# Patient Record
Sex: MALE | Race: WHITE | Employment: OTHER | URBAN - METROPOLITAN AREA
[De-identification: names, ages, dates, MRNs, and addresses within clinical notes are randomized per-mention and may not be internally consistent; named-entity substitution may affect disease eponyms.]

---
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
## 2017-01-24 ENCOUNTER — TELEPHONE (OUTPATIENT)
Dept: CARDIOLOGY CLINIC | Age: 72
End: 2017-01-24

## 2017-01-24 ENCOUNTER — OFFICE VISIT (OUTPATIENT)
Dept: CARDIOLOGY CLINIC | Age: 72
End: 2017-01-24

## 2017-01-24 ENCOUNTER — ANTI-COAG VISIT (OUTPATIENT)
Dept: CARDIOLOGY CLINIC | Age: 72
End: 2017-01-24

## 2017-01-24 VITALS
BODY MASS INDEX: 35.14 KG/M2 | DIASTOLIC BLOOD PRESSURE: 80 MMHG | SYSTOLIC BLOOD PRESSURE: 128 MMHG | HEIGHT: 71 IN | HEART RATE: 68 BPM | WEIGHT: 251 LBS

## 2017-01-24 DIAGNOSIS — G47.33 OBSTRUCTIVE SLEEP APNEA SYNDROME: ICD-10-CM

## 2017-01-24 DIAGNOSIS — I48.91 ATRIAL FIBRILLATION, UNSPECIFIED TYPE (HCC): ICD-10-CM

## 2017-01-24 DIAGNOSIS — R00.0 TACHYCARDIA: ICD-10-CM

## 2017-01-24 DIAGNOSIS — E78.5 HYPERLIPIDEMIA, UNSPECIFIED HYPERLIPIDEMIA TYPE: Primary | ICD-10-CM

## 2017-01-24 DIAGNOSIS — Z72.0 TOBACCO ABUSE: ICD-10-CM

## 2017-01-24 PROCEDURE — G8427 DOCREV CUR MEDS BY ELIG CLIN: HCPCS | Performed by: INTERNAL MEDICINE

## 2017-01-24 PROCEDURE — 3017F COLORECTAL CA SCREEN DOC REV: CPT | Performed by: INTERNAL MEDICINE

## 2017-01-24 PROCEDURE — 4040F PNEUMOC VAC/ADMIN/RCVD: CPT | Performed by: INTERNAL MEDICINE

## 2017-01-24 PROCEDURE — 1123F ACP DISCUSS/DSCN MKR DOCD: CPT | Performed by: INTERNAL MEDICINE

## 2017-01-24 PROCEDURE — 4004F PT TOBACCO SCREEN RCVD TLK: CPT | Performed by: INTERNAL MEDICINE

## 2017-01-24 PROCEDURE — G8419 CALC BMI OUT NRM PARAM NOF/U: HCPCS | Performed by: INTERNAL MEDICINE

## 2017-01-24 PROCEDURE — G8484 FLU IMMUNIZE NO ADMIN: HCPCS | Performed by: INTERNAL MEDICINE

## 2017-01-24 PROCEDURE — 99214 OFFICE O/P EST MOD 30 MIN: CPT | Performed by: INTERNAL MEDICINE

## 2017-01-24 RX ORDER — DIGOXIN 125 MCG
125 TABLET ORAL DAILY
Qty: 90 TABLET | Refills: 3 | Status: SHIPPED | OUTPATIENT
Start: 2017-01-24 | End: 2018-02-09 | Stop reason: SDUPTHER

## 2017-01-24 RX ORDER — LOVASTATIN 10 MG/1
10 TABLET ORAL NIGHTLY
Qty: 90 TABLET | Refills: 3 | Status: SHIPPED | OUTPATIENT
Start: 2017-01-24 | End: 2018-02-09 | Stop reason: SDUPTHER

## 2017-01-24 RX ORDER — ATENOLOL 25 MG/1
25 TABLET ORAL DAILY
Qty: 90 TABLET | Refills: 3 | Status: SHIPPED | OUTPATIENT
Start: 2017-01-24 | End: 2018-02-15 | Stop reason: SDUPTHER

## 2017-01-26 ENCOUNTER — TELEPHONE (OUTPATIENT)
Dept: CARDIOLOGY CLINIC | Age: 72
End: 2017-01-26

## 2017-02-10 ENCOUNTER — TELEPHONE (OUTPATIENT)
Dept: CARDIOLOGY CLINIC | Age: 72
End: 2017-02-10

## 2017-02-15 LAB — INR BLD: 1.74

## 2017-02-16 ENCOUNTER — ANTI-COAG VISIT (OUTPATIENT)
Dept: CARDIOLOGY CLINIC | Age: 72
End: 2017-02-16

## 2017-03-06 LAB — INR BLD: 3.3

## 2017-03-07 ENCOUNTER — ANTI-COAG VISIT (OUTPATIENT)
Dept: CARDIOLOGY CLINIC | Age: 72
End: 2017-03-07

## 2017-03-24 LAB — INR BLD: 2.11

## 2017-03-27 ENCOUNTER — ANTI-COAG VISIT (OUTPATIENT)
Dept: CARDIOLOGY CLINIC | Age: 72
End: 2017-03-27

## 2017-04-24 LAB — INR BLD: 3.13

## 2017-04-25 ENCOUNTER — ANTI-COAG VISIT (OUTPATIENT)
Dept: CARDIOLOGY CLINIC | Age: 72
End: 2017-04-25

## 2017-05-09 RX ORDER — WARFARIN SODIUM 5 MG/1
TABLET ORAL
Qty: 90 TABLET | Refills: 3 | Status: SHIPPED | OUTPATIENT
Start: 2017-05-09 | End: 2018-02-09 | Stop reason: SDUPTHER

## 2017-05-26 LAB — INR BLD: 2.32

## 2017-06-02 ENCOUNTER — TELEPHONE (OUTPATIENT)
Dept: CARDIOLOGY CLINIC | Age: 72
End: 2017-06-02

## 2017-06-02 ENCOUNTER — ANTI-COAG VISIT (OUTPATIENT)
Dept: CARDIOLOGY CLINIC | Age: 72
End: 2017-06-02

## 2017-06-15 ENCOUNTER — TELEPHONE (OUTPATIENT)
Dept: CARDIOLOGY CLINIC | Age: 72
End: 2017-06-15

## 2017-06-27 LAB — INR BLD: 1.5

## 2017-07-05 ENCOUNTER — TELEPHONE (OUTPATIENT)
Dept: CARDIOLOGY CLINIC | Age: 72
End: 2017-07-05

## 2017-07-06 ENCOUNTER — ANTI-COAG VISIT (OUTPATIENT)
Dept: CARDIOLOGY CLINIC | Age: 72
End: 2017-07-06

## 2017-08-02 LAB — INR BLD: 2.25

## 2017-08-03 ENCOUNTER — ANTI-COAG VISIT (OUTPATIENT)
Dept: CARDIOLOGY CLINIC | Age: 72
End: 2017-08-03

## 2017-09-06 LAB — INR BLD: 1.69

## 2017-09-07 ENCOUNTER — ANTI-COAG VISIT (OUTPATIENT)
Dept: CARDIOLOGY CLINIC | Age: 72
End: 2017-09-07

## 2017-09-18 LAB — INR BLD: 2.65

## 2017-09-19 ENCOUNTER — ANTI-COAG VISIT (OUTPATIENT)
Dept: CARDIOLOGY CLINIC | Age: 72
End: 2017-09-19

## 2017-10-16 ENCOUNTER — TELEPHONE (OUTPATIENT)
Dept: CARDIOLOGY CLINIC | Age: 72
End: 2017-10-16

## 2017-10-16 DIAGNOSIS — I48.91 ATRIAL FIBRILLATION, UNSPECIFIED TYPE (HCC): Primary | ICD-10-CM

## 2017-10-16 LAB — INR BLD: 1.86

## 2017-10-16 NOTE — TELEPHONE ENCOUNTER
Lab calling stating pts standing protime order has , new order placed and faxed to lab at [phone]

## 2017-10-17 ENCOUNTER — ANTI-COAG VISIT (OUTPATIENT)
Dept: CARDIOLOGY CLINIC | Age: 72
End: 2017-10-17

## 2017-11-20 LAB — INR BLD: 2.31

## 2017-11-21 ENCOUNTER — ANTI-COAG VISIT (OUTPATIENT)
Dept: CARDIOLOGY CLINIC | Age: 72
End: 2017-11-21

## 2017-11-21 DIAGNOSIS — I48.91 ATRIAL FIBRILLATION, UNSPECIFIED TYPE (HCC): ICD-10-CM

## 2017-12-14 ENCOUNTER — ANTI-COAG VISIT (OUTPATIENT)
Dept: CARDIOLOGY CLINIC | Age: 72
End: 2017-12-14

## 2017-12-26 LAB — INR BLD: 1.92

## 2017-12-27 ENCOUNTER — ANTI-COAG VISIT (OUTPATIENT)
Dept: CARDIOLOGY CLINIC | Age: 72
End: 2017-12-27

## 2018-01-17 LAB — INR BLD: 2.02

## 2018-01-18 ENCOUNTER — ANTI-COAG VISIT (OUTPATIENT)
Dept: CARDIOLOGY CLINIC | Age: 73
End: 2018-01-18

## 2018-01-29 LAB — INR BLD: 2.45

## 2018-01-30 ENCOUNTER — ANTI-COAG VISIT (OUTPATIENT)
Dept: CARDIOLOGY CLINIC | Age: 73
End: 2018-01-30

## 2018-02-12 RX ORDER — LOVASTATIN 10 MG/1
TABLET ORAL
Qty: 90 TABLET | Refills: 3 | Status: SHIPPED | OUTPATIENT
Start: 2018-02-12 | End: 2021-01-21

## 2018-02-12 RX ORDER — DIGOXIN 125 MCG
TABLET ORAL
Qty: 90 TABLET | Refills: 3 | Status: SHIPPED | OUTPATIENT
Start: 2018-02-12 | End: 2019-03-11 | Stop reason: SDUPTHER

## 2018-02-12 RX ORDER — WARFARIN SODIUM 5 MG/1
TABLET ORAL
Qty: 90 TABLET | Refills: 3 | Status: SHIPPED | OUTPATIENT
Start: 2018-02-12 | End: 2019-02-18 | Stop reason: SDUPTHER

## 2018-02-15 ENCOUNTER — OFFICE VISIT (OUTPATIENT)
Dept: CARDIOLOGY CLINIC | Age: 73
End: 2018-02-15

## 2018-02-15 VITALS
HEART RATE: 64 BPM | HEIGHT: 71 IN | DIASTOLIC BLOOD PRESSURE: 70 MMHG | WEIGHT: 246 LBS | BODY MASS INDEX: 34.44 KG/M2 | SYSTOLIC BLOOD PRESSURE: 110 MMHG

## 2018-02-15 DIAGNOSIS — G47.33 OBSTRUCTIVE SLEEP APNEA SYNDROME: ICD-10-CM

## 2018-02-15 DIAGNOSIS — E78.5 HYPERLIPIDEMIA, UNSPECIFIED HYPERLIPIDEMIA TYPE: ICD-10-CM

## 2018-02-15 DIAGNOSIS — Z72.0 TOBACCO ABUSE: ICD-10-CM

## 2018-02-15 DIAGNOSIS — I48.91 ATRIAL FIBRILLATION, UNSPECIFIED TYPE (HCC): Primary | ICD-10-CM

## 2018-02-15 LAB
ALBUMIN SERPL-MCNC: 3.8 G/DL (ref 3.5–5)
ALBUMIN SERPL-MCNC: 3.8 G/DL (ref 3.5–5)
ALP BLD-CCNC: 84 IU/L (ref 40–129)
ALT SERPL-CCNC: 22 IU/L (ref 10–35)
AST SERPL-CCNC: 20 IU/L (ref 10–50)
BILIRUB SERPL-MCNC: 0.4 MG/DL (ref 0–1)
BILIRUBIN DIRECT: <0.2 MG/DL (ref 0–0.2)
BUN BLDV-MCNC: 15 MG/DL (ref 8–26)
CALCIUM SERPL-MCNC: 9 MG/DL (ref 8.8–10.1)
CHLORIDE BLD-SCNC: 104 MEQ/L (ref 101–111)
CHOLESTEROL, TOTAL: 150 MG/DL
CHOLESTEROL/HDL RATIO: 4.4 (ref 1.9–4.2)
CO2: 25 MEQ/L (ref 22–29)
CREAT SERPL-MCNC: 1.16 MG/DL (ref 0.64–1.27)
GFR AFRICAN AMERICAN: >60 ML/MIN/1.73 SQ METER
GFR NON-AFRICAN AMERICAN: >60 ML/MIN/1.73 SQ METER
GLUCOSE BLD-MCNC: 131 MG/DL (ref 70–99)
HDLC SERPL-MCNC: 34 MG/DL
LDL CHOLESTEROL CALCULATED: 91 MG/DL
LDL/HDL RATIO: 2.7 (ref 1–4)
PHOSPHORUS: 3.4 MG/DL (ref 2.7–4.5)
POTASSIUM SERPL-SCNC: 4.6 MEQ/L (ref 3.6–5.1)
SODIUM BLD-SCNC: 138 MEQ/L (ref 135–145)
TOTAL PROTEIN: 7.3 G/DL (ref 6.6–8.7)
TRIGL SERPL-MCNC: 126 MG/DL

## 2018-02-15 PROCEDURE — G8484 FLU IMMUNIZE NO ADMIN: HCPCS | Performed by: INTERNAL MEDICINE

## 2018-02-15 PROCEDURE — 4004F PT TOBACCO SCREEN RCVD TLK: CPT | Performed by: INTERNAL MEDICINE

## 2018-02-15 PROCEDURE — 3017F COLORECTAL CA SCREEN DOC REV: CPT | Performed by: INTERNAL MEDICINE

## 2018-02-15 PROCEDURE — 1123F ACP DISCUSS/DSCN MKR DOCD: CPT | Performed by: INTERNAL MEDICINE

## 2018-02-15 PROCEDURE — G8417 CALC BMI ABV UP PARAM F/U: HCPCS | Performed by: INTERNAL MEDICINE

## 2018-02-15 PROCEDURE — 99213 OFFICE O/P EST LOW 20 MIN: CPT | Performed by: INTERNAL MEDICINE

## 2018-02-15 PROCEDURE — G8427 DOCREV CUR MEDS BY ELIG CLIN: HCPCS | Performed by: INTERNAL MEDICINE

## 2018-02-15 PROCEDURE — 4040F PNEUMOC VAC/ADMIN/RCVD: CPT | Performed by: INTERNAL MEDICINE

## 2018-02-15 RX ORDER — ATENOLOL 25 MG/1
25 TABLET ORAL DAILY
Qty: 90 TABLET | Refills: 3 | Status: SHIPPED | OUTPATIENT
Start: 2018-02-15 | End: 2019-02-18 | Stop reason: SDUPTHER

## 2018-02-22 LAB — INR BLD: 2.26

## 2018-02-23 ENCOUNTER — ANTI-COAG VISIT (OUTPATIENT)
Dept: CARDIOLOGY CLINIC | Age: 73
End: 2018-02-23

## 2018-03-19 LAB — INR BLD: 2.63

## 2018-03-20 ENCOUNTER — ANTI-COAG VISIT (OUTPATIENT)
Dept: CARDIOLOGY CLINIC | Age: 73
End: 2018-03-20

## 2018-04-20 ENCOUNTER — TELEPHONE (OUTPATIENT)
Dept: CARDIOLOGY CLINIC | Age: 73
End: 2018-04-20

## 2018-04-20 DIAGNOSIS — I48.91 ATRIAL FIBRILLATION, UNSPECIFIED TYPE (HCC): Primary | ICD-10-CM

## 2018-04-20 LAB — INR BLD: 2.61

## 2018-04-23 ENCOUNTER — ANTI-COAG VISIT (OUTPATIENT)
Dept: CARDIOLOGY CLINIC | Age: 73
End: 2018-04-23

## 2018-05-02 ENCOUNTER — TELEPHONE (OUTPATIENT)
Dept: CARDIOLOGY CLINIC | Age: 73
End: 2018-05-02

## 2018-05-03 ENCOUNTER — TELEPHONE (OUTPATIENT)
Dept: CARDIOLOGY CLINIC | Age: 73
End: 2018-05-03

## 2018-05-17 LAB — INR BLD: 1.4

## 2018-05-18 ENCOUNTER — ANTI-COAG VISIT (OUTPATIENT)
Dept: CARDIOLOGY CLINIC | Age: 73
End: 2018-05-18

## 2018-06-07 ENCOUNTER — TELEPHONE (OUTPATIENT)
Dept: CARDIOLOGY CLINIC | Age: 73
End: 2018-06-07

## 2018-06-07 DIAGNOSIS — I48.91 ATRIAL FIBRILLATION, UNSPECIFIED TYPE (HCC): Primary | ICD-10-CM

## 2018-06-07 LAB — INR BLD: 3

## 2018-06-11 ENCOUNTER — ANTI-COAG VISIT (OUTPATIENT)
Dept: CARDIOLOGY CLINIC | Age: 73
End: 2018-06-11

## 2018-06-11 ENCOUNTER — TELEPHONE (OUTPATIENT)
Dept: CARDIOLOGY CLINIC | Age: 73
End: 2018-06-11

## 2018-06-11 DIAGNOSIS — I48.91 ATRIAL FIBRILLATION, UNSPECIFIED TYPE (HCC): ICD-10-CM

## 2018-07-09 LAB — INR BLD: 2.82

## 2018-07-10 ENCOUNTER — ANTI-COAG VISIT (OUTPATIENT)
Dept: CARDIOLOGY CLINIC | Age: 73
End: 2018-07-10

## 2018-08-06 LAB — INR BLD: 2.48

## 2018-08-07 ENCOUNTER — ANTI-COAG VISIT (OUTPATIENT)
Dept: CARDIOLOGY CLINIC | Age: 73
End: 2018-08-07

## 2018-09-05 LAB — INR BLD: 2.45

## 2018-09-06 ENCOUNTER — ANTI-COAG VISIT (OUTPATIENT)
Dept: CARDIOLOGY CLINIC | Age: 73
End: 2018-09-06

## 2018-10-08 LAB — INR BLD: 3.88

## 2018-10-09 ENCOUNTER — ANTI-COAG VISIT (OUTPATIENT)
Dept: CARDIOLOGY CLINIC | Age: 73
End: 2018-10-09

## 2018-10-15 ENCOUNTER — TELEPHONE (OUTPATIENT)
Dept: CARDIOLOGY CLINIC | Age: 73
End: 2018-10-15

## 2018-10-15 DIAGNOSIS — I48.91 ATRIAL FIBRILLATION, UNSPECIFIED TYPE (HCC): Primary | ICD-10-CM

## 2018-10-16 ENCOUNTER — TELEPHONE (OUTPATIENT)
Dept: CARDIOLOGY CLINIC | Age: 73
End: 2018-10-16

## 2018-10-16 DIAGNOSIS — I48.91 ATRIAL FIBRILLATION, UNSPECIFIED TYPE (HCC): Primary | ICD-10-CM

## 2018-10-16 LAB — INR BLD: 2.41

## 2018-10-17 ENCOUNTER — ANTI-COAG VISIT (OUTPATIENT)
Dept: CARDIOLOGY CLINIC | Age: 73
End: 2018-10-17

## 2018-11-02 ENCOUNTER — TELEPHONE (OUTPATIENT)
Dept: CARDIOLOGY CLINIC | Age: 73
End: 2018-11-02

## 2018-11-02 DIAGNOSIS — Z79.899 LONG-TERM USE OF HIGH-RISK MEDICATION: ICD-10-CM

## 2018-11-02 DIAGNOSIS — I48.91 ATRIAL FIBRILLATION, UNSPECIFIED TYPE (HCC): Primary | ICD-10-CM

## 2018-11-06 LAB — INR BLD: 3.46

## 2018-11-07 ENCOUNTER — ANTI-COAG VISIT (OUTPATIENT)
Dept: CARDIOLOGY CLINIC | Age: 73
End: 2018-11-07

## 2018-11-12 LAB — INR BLD: 2.1

## 2018-11-13 ENCOUNTER — ANTI-COAG VISIT (OUTPATIENT)
Dept: CARDIOLOGY CLINIC | Age: 73
End: 2018-11-13

## 2018-12-12 LAB — INR BLD: 3.58

## 2018-12-13 ENCOUNTER — ANTI-COAG VISIT (OUTPATIENT)
Dept: CARDIOLOGY CLINIC | Age: 73
End: 2018-12-13

## 2018-12-21 LAB — INR BLD: 1.82

## 2018-12-24 ENCOUNTER — ANTI-COAG VISIT (OUTPATIENT)
Dept: CARDIOLOGY CLINIC | Age: 73
End: 2018-12-24

## 2018-12-24 DIAGNOSIS — I48.91 ATRIAL FIBRILLATION, UNSPECIFIED TYPE (HCC): ICD-10-CM

## 2018-12-28 LAB — INR BLD: 2.06

## 2019-01-02 ENCOUNTER — ANTI-COAG VISIT (OUTPATIENT)
Dept: CARDIOLOGY CLINIC | Age: 74
End: 2019-01-02

## 2019-01-02 DIAGNOSIS — I48.91 ATRIAL FIBRILLATION, UNSPECIFIED TYPE (HCC): ICD-10-CM

## 2019-01-11 LAB
INR BLD: 1.82
INR BLD: 1.82

## 2019-01-14 ENCOUNTER — ANTI-COAG VISIT (OUTPATIENT)
Dept: CARDIOLOGY CLINIC | Age: 74
End: 2019-01-14

## 2019-02-01 ENCOUNTER — TELEPHONE (OUTPATIENT)
Dept: CARDIOLOGY CLINIC | Age: 74
End: 2019-02-01

## 2019-02-01 ENCOUNTER — ANTI-COAG VISIT (OUTPATIENT)
Dept: CARDIOLOGY CLINIC | Age: 74
End: 2019-02-01

## 2019-02-01 DIAGNOSIS — I48.91 ATRIAL FIBRILLATION, UNSPECIFIED TYPE (HCC): ICD-10-CM

## 2019-02-01 LAB — INR BLD: 2.2

## 2019-02-19 RX ORDER — ATENOLOL 25 MG/1
TABLET ORAL
Qty: 90 TABLET | Refills: 0 | Status: SHIPPED | OUTPATIENT
Start: 2019-02-19 | End: 2019-04-22 | Stop reason: SDUPTHER

## 2019-02-19 RX ORDER — WARFARIN SODIUM 5 MG/1
TABLET ORAL
Qty: 90 TABLET | Refills: 0 | Status: SHIPPED | OUTPATIENT
Start: 2019-02-19 | End: 2019-04-22 | Stop reason: SDUPTHER

## 2019-02-21 ENCOUNTER — OFFICE VISIT (OUTPATIENT)
Dept: CARDIOLOGY CLINIC | Age: 74
End: 2019-02-21
Payer: MEDICARE

## 2019-02-21 VITALS
WEIGHT: 252 LBS | BODY MASS INDEX: 35.28 KG/M2 | HEIGHT: 71 IN | DIASTOLIC BLOOD PRESSURE: 74 MMHG | HEART RATE: 64 BPM | SYSTOLIC BLOOD PRESSURE: 120 MMHG

## 2019-02-21 DIAGNOSIS — I25.83 CORONARY ARTERY DISEASE DUE TO LIPID RICH PLAQUE: ICD-10-CM

## 2019-02-21 DIAGNOSIS — E78.5 HYPERLIPIDEMIA, UNSPECIFIED HYPERLIPIDEMIA TYPE: ICD-10-CM

## 2019-02-21 DIAGNOSIS — I48.91 ATRIAL FIBRILLATION, UNSPECIFIED TYPE (HCC): Primary | ICD-10-CM

## 2019-02-21 DIAGNOSIS — I25.10 CORONARY ARTERY DISEASE DUE TO LIPID RICH PLAQUE: ICD-10-CM

## 2019-02-21 DIAGNOSIS — G47.33 OBSTRUCTIVE SLEEP APNEA SYNDROME: ICD-10-CM

## 2019-02-21 DIAGNOSIS — R06.02 SHORTNESS OF BREATH: ICD-10-CM

## 2019-02-21 DIAGNOSIS — Z72.0 TOBACCO ABUSE: ICD-10-CM

## 2019-02-21 PROCEDURE — G8484 FLU IMMUNIZE NO ADMIN: HCPCS | Performed by: INTERNAL MEDICINE

## 2019-02-21 PROCEDURE — G8598 ASA/ANTIPLAT THER USED: HCPCS | Performed by: INTERNAL MEDICINE

## 2019-02-21 PROCEDURE — G8417 CALC BMI ABV UP PARAM F/U: HCPCS | Performed by: INTERNAL MEDICINE

## 2019-02-21 PROCEDURE — 1101F PT FALLS ASSESS-DOCD LE1/YR: CPT | Performed by: INTERNAL MEDICINE

## 2019-02-21 PROCEDURE — 4040F PNEUMOC VAC/ADMIN/RCVD: CPT | Performed by: INTERNAL MEDICINE

## 2019-02-21 PROCEDURE — 99214 OFFICE O/P EST MOD 30 MIN: CPT | Performed by: INTERNAL MEDICINE

## 2019-02-21 PROCEDURE — G8427 DOCREV CUR MEDS BY ELIG CLIN: HCPCS | Performed by: INTERNAL MEDICINE

## 2019-02-21 PROCEDURE — 1123F ACP DISCUSS/DSCN MKR DOCD: CPT | Performed by: INTERNAL MEDICINE

## 2019-02-21 PROCEDURE — 4004F PT TOBACCO SCREEN RCVD TLK: CPT | Performed by: INTERNAL MEDICINE

## 2019-02-21 PROCEDURE — 3017F COLORECTAL CA SCREEN DOC REV: CPT | Performed by: INTERNAL MEDICINE

## 2019-02-21 RX ORDER — ROSUVASTATIN CALCIUM 10 MG/1
10 TABLET, COATED ORAL DAILY
Qty: 30 TABLET | Refills: 11 | Status: SHIPPED | OUTPATIENT
Start: 2019-02-21 | End: 2019-04-10 | Stop reason: SDUPTHER

## 2019-02-26 ENCOUNTER — HOSPITAL ENCOUNTER (OUTPATIENT)
Dept: NON INVASIVE DIAGNOSTICS | Age: 74
Discharge: HOME | End: 2019-02-26
Payer: MEDICARE

## 2019-02-26 DIAGNOSIS — I25.10 CORONARY ARTERY DISEASE DUE TO LIPID RICH PLAQUE: ICD-10-CM

## 2019-02-26 DIAGNOSIS — I25.83 CORONARY ARTERY DISEASE DUE TO LIPID RICH PLAQUE: ICD-10-CM

## 2019-02-26 DIAGNOSIS — E78.5 HYPERLIPIDEMIA, UNSPECIFIED HYPERLIPIDEMIA TYPE: ICD-10-CM

## 2019-02-26 DIAGNOSIS — R06.02 SHORTNESS OF BREATH: ICD-10-CM

## 2019-02-26 LAB
LV EF: 61 %
LVEF MODALITY: NORMAL

## 2019-02-26 PROCEDURE — 3430000000 HC RX DIAGNOSTIC RADIOPHARMACEUTICAL: Performed by: INTERNAL MEDICINE

## 2019-02-26 PROCEDURE — 78452 HT MUSCLE IMAGE SPECT MULT: CPT | Performed by: INTERNAL MEDICINE

## 2019-02-26 PROCEDURE — 6360000002 HC RX W HCPCS: Performed by: INTERNAL MEDICINE

## 2019-02-26 PROCEDURE — 93017 CV STRESS TEST TRACING ONLY: CPT | Performed by: INTERNAL MEDICINE

## 2019-02-26 PROCEDURE — A9502 TC99M TETROFOSMIN: HCPCS | Performed by: INTERNAL MEDICINE

## 2019-02-26 RX ADMIN — TETROFOSMIN 30 MILLICURIE: 0.23 INJECTION, POWDER, LYOPHILIZED, FOR SOLUTION INTRAVENOUS at 13:07

## 2019-02-26 RX ADMIN — TETROFOSMIN 10 MILLICURIE: 0.23 INJECTION, POWDER, LYOPHILIZED, FOR SOLUTION INTRAVENOUS at 11:41

## 2019-02-26 RX ADMIN — REGADENOSON 0.4 MG: 0.08 INJECTION, SOLUTION INTRAVENOUS at 13:03

## 2019-03-04 LAB — INR BLD: 2.81

## 2019-03-05 ENCOUNTER — ANTI-COAG VISIT (OUTPATIENT)
Dept: CARDIOLOGY CLINIC | Age: 74
End: 2019-03-05

## 2019-03-06 LAB — INR BLD: 3.6

## 2019-03-11 RX ORDER — DIGOXIN 125 MCG
TABLET ORAL
Qty: 90 TABLET | Refills: 1 | Status: SHIPPED | OUTPATIENT
Start: 2019-03-11 | End: 2019-03-14 | Stop reason: SDUPTHER

## 2019-03-14 RX ORDER — DIGOXIN 125 MCG
125 TABLET ORAL DAILY
Qty: 90 TABLET | Refills: 1 | Status: SHIPPED | OUTPATIENT
Start: 2019-03-14 | End: 2019-03-15 | Stop reason: SDUPTHER

## 2019-03-15 RX ORDER — DIGOXIN 125 MCG
125 TABLET ORAL DAILY
Qty: 90 TABLET | Refills: 1 | Status: SHIPPED | OUTPATIENT
Start: 2019-03-15 | End: 2019-10-01 | Stop reason: SDUPTHER

## 2019-03-29 LAB — INR BLD: 2.94

## 2019-04-01 ENCOUNTER — ANTI-COAG VISIT (OUTPATIENT)
Dept: CARDIOLOGY CLINIC | Age: 74
End: 2019-04-01

## 2019-04-10 DIAGNOSIS — E78.5 HYPERLIPIDEMIA, UNSPECIFIED HYPERLIPIDEMIA TYPE: Primary | ICD-10-CM

## 2019-04-10 DIAGNOSIS — I25.83 CORONARY ARTERY DISEASE DUE TO LIPID RICH PLAQUE: ICD-10-CM

## 2019-04-10 DIAGNOSIS — I25.10 CORONARY ARTERY DISEASE DUE TO LIPID RICH PLAQUE: ICD-10-CM

## 2019-04-10 RX ORDER — ROSUVASTATIN CALCIUM 10 MG/1
10 TABLET, COATED ORAL DAILY
Qty: 90 TABLET | Refills: 0 | Status: SHIPPED | OUTPATIENT
Start: 2019-04-10 | End: 2019-04-12 | Stop reason: SDUPTHER

## 2019-04-10 NOTE — TELEPHONE ENCOUNTER
Gerold Hammans from [facility] called requesting a refill on Crestor 10 mg. Please advise.

## 2019-04-10 NOTE — TELEPHONE ENCOUNTER
Pt has not had his labs checked since 2/2018 per our chart. I spoke to the pt and he states that his statin was changed but he was not given orders for blood work at his last OV. Pt would like for me to fax the orders to [facility] in [place]. Placed lipid panel, BMP, ALT, AST in the pt's chart. Printed out the orders and faxed to [phone]. E prescribed Crestor 10 mg #90 with 0 refills to [facility].         Last OV with [facility] 2/21/19   Plan:  Start asa 81 mg daily  Change mevacor to crestor 10 mg  lexiscan  LLL  Follow up in 6 months

## 2019-04-12 RX ORDER — ROSUVASTATIN CALCIUM 10 MG/1
10 TABLET, COATED ORAL DAILY
Qty: 90 TABLET | Refills: 3 | Status: SHIPPED | OUTPATIENT
Start: 2019-04-12 | End: 2020-07-10

## 2019-04-17 ENCOUNTER — TELEPHONE (OUTPATIENT)
Dept: CARDIOLOGY CLINIC | Age: 74
End: 2019-04-17

## 2019-04-17 NOTE — TELEPHONE ENCOUNTER
Pt calling. PCP would like a copy of blood results faxed to their office Dr. Carey Brambila fax number [phone].

## 2019-04-23 RX ORDER — WARFARIN SODIUM 5 MG/1
TABLET ORAL
Qty: 90 TABLET | Refills: 0 | Status: SHIPPED | OUTPATIENT
Start: 2019-04-23 | End: 2019-11-18 | Stop reason: SDUPTHER

## 2019-04-23 RX ORDER — ATENOLOL 25 MG/1
TABLET ORAL
Qty: 90 TABLET | Refills: 3 | Status: SHIPPED | OUTPATIENT
Start: 2019-04-23 | End: 2020-03-09 | Stop reason: SDUPTHER

## 2019-05-03 ENCOUNTER — TELEPHONE (OUTPATIENT)
Dept: CARDIOLOGY CLINIC | Age: 74
End: 2019-05-03

## 2019-05-03 DIAGNOSIS — I48.91 ATRIAL FIBRILLATION, UNSPECIFIED TYPE (HCC): Primary | ICD-10-CM

## 2019-05-03 NOTE — TELEPHONE ENCOUNTER
Pt is calling, he needs to have orders for his Protime/INR faxed to [facility] in [place], [place] - fax #[phone]. He needs these labs done daily. So order would need to state daily drawls. If any questions pls call Machelle @ [phone]. Thank you.

## 2019-05-07 ENCOUNTER — TELEPHONE (OUTPATIENT)
Dept: CARDIOLOGY CLINIC | Age: 74
End: 2019-05-07

## 2019-05-07 NOTE — TELEPHONE ENCOUNTER
Spoke to the pt-advised we do not have the INR yet. Called [facility] was with patients. Faxed a request for the INR to be faxed to [phone].

## 2019-05-08 ENCOUNTER — ANTI-COAG VISIT (OUTPATIENT)
Dept: CARDIOLOGY CLINIC | Age: 74
End: 2019-05-08

## 2019-05-08 LAB — INR BLD: 2.73

## 2019-06-04 ENCOUNTER — ANTI-COAG VISIT (OUTPATIENT)
Dept: CARDIOLOGY CLINIC | Age: 74
End: 2019-06-04

## 2019-06-04 DIAGNOSIS — I48.91 ATRIAL FIBRILLATION, UNSPECIFIED TYPE (HCC): ICD-10-CM

## 2019-06-04 LAB — INR BLD: 2.5

## 2019-06-12 LAB — INR BLD: 3.53

## 2019-06-13 ENCOUNTER — ANTI-COAG VISIT (OUTPATIENT)
Dept: CARDIOLOGY CLINIC | Age: 74
End: 2019-06-13

## 2019-06-19 LAB — INR BLD: 3.11

## 2019-06-21 ENCOUNTER — ANTI-COAG VISIT (OUTPATIENT)
Dept: CARDIOLOGY CLINIC | Age: 74
End: 2019-06-21

## 2019-06-21 DIAGNOSIS — I48.91 ATRIAL FIBRILLATION, UNSPECIFIED TYPE (HCC): ICD-10-CM

## 2019-06-21 NOTE — PROGRESS NOTES
Pt states he is off of antibiotics now and eating more greens. Wants to go back to his 2.5mg on Tuesday and 5mg rest of week, instead of 2.5mg tues and thurs and 5mg rest of week. I informed that is ok, recheck next Wednesday.

## 2019-06-26 LAB — INR BLD: 3.6

## 2019-06-27 ENCOUNTER — ANTI-COAG VISIT (OUTPATIENT)
Dept: CARDIOLOGY CLINIC | Age: 74
End: 2019-06-27

## 2019-07-05 LAB — INR BLD: 2.95

## 2019-07-08 ENCOUNTER — ANTI-COAG VISIT (OUTPATIENT)
Dept: CARDIOLOGY CLINIC | Age: 74
End: 2019-07-08

## 2019-07-16 LAB — INR BLD: 3.92

## 2019-07-17 ENCOUNTER — ANTI-COAG VISIT (OUTPATIENT)
Dept: CARDIOLOGY CLINIC | Age: 74
End: 2019-07-17

## 2019-07-19 NOTE — TELEPHONE ENCOUNTER
Called and spoke with Ken Pradhan. They are asking for a weekly standing order in case patient does not make in correct timing or if they need to redraw instead of needing to call the office for a one time draw.  Gave verbal. Confirmed that patient received an additional set of vital signs prior to discharge.

## 2019-07-25 ENCOUNTER — ANTI-COAG VISIT (OUTPATIENT)
Dept: CARDIOLOGY CLINIC | Age: 74
End: 2019-07-25
Payer: MEDICARE

## 2019-07-25 DIAGNOSIS — I48.91 ATRIAL FIBRILLATION, UNSPECIFIED TYPE (HCC): ICD-10-CM

## 2019-07-25 LAB — INR BLD: 2.54

## 2019-07-25 PROCEDURE — 85610 PROTHROMBIN TIME: CPT | Performed by: INTERNAL MEDICINE

## 2019-07-25 PROCEDURE — 36415 COLL VENOUS BLD VENIPUNCTURE: CPT | Performed by: INTERNAL MEDICINE

## 2019-07-30 NOTE — PROGRESS NOTES
[facility]   Cardiac Follow up    Referring Provider:  Kirill Conner MD     Chief Complaint   Patient presents with    Atrial Fibrillation        History of Present Illness:  Mr Jacqui Villegas is a 68 y.o. male seen as a follow up for AF, htn,hchol, GARRISON. He gets infusions for RA. Also has pulmonary fibrosis. He is a former smoker, chews tobacco.    Today, he states he is riding his airdyne for exercise, goal is to lose 30 pounds. He has no resting or exertional chest pain,  palpitations or dizziness. He has unchanged exertional sob. He does not smoke, but chews tobacco  Patient is compliant  with medication and is tolerating them well without side effects      Past Medical History:   has a past medical history of A-fib ([address] Atrial fibrillation ([address] Hyperlipidemia, Hypertension, and GARRISON (obstructive sleep apnea). Surgical History:   has a past surgical history that includes lymph node biopsy (Right, 2018). Social History:   reports that he quit smoking about 7 years ago. He has a 0.50 pack-year smoking history. His smokeless tobacco use includes chew. He reports that he drinks alcohol. He reports that he does not use drugs. Family History:  family history includes Heart Attack in his father.      Home Medications:  Prior to Admission medications      Current Outpatient Medications   Medication Sig Dispense Refill    riTUXimab (RITUXAN) 500 MG/50ML chemo injection Infuse 375 mg/m2 intravenously once Indications: 1000       warfarin (COUMADIN) 5 MG tablet TAKE 1 TABLET EVERY DAY OR AS DIRECTED ACCORDING TO INR RESULTS 90 tablet 0    atenolol (TENORMIN) 25 MG tablet TAKE 1 TABLET EVERY DAY 90 tablet 3    rosuvastatin (CRESTOR) 10 MG tablet Take 1 tablet by mouth daily 90 tablet 3    digoxin (LANOXIN) 125 MCG tablet Take 1 tablet by mouth daily 90 tablet 1    aspirin 81 MG tablet Monday Wednesday Friday 30 tablet 3    lovastatin (MEVACOR) 10 MG tablet TAKE 1 TABLET EVERY NIGHT 90 tablet 3    metFORMIN (GLUCOPHAGE) 500 MG tablet Take 500 mg by mouth 2 times daily (with meals)       Certolizumab Pegol (CIMZIA SC) Inject  into the skin every 30 days. No current facility-administered medications for this visit. Allergies:  Lipitor [atorvastatin] and Pravastatin sodium     Review of Systems:   · Constitutional: there has been no unanticipated weight loss. There's been no change in energy level, sleep pattern, or activity level. · Eyes: No visual changes or diplopia. No scleral icterus. · ENT: No Headaches, hearing loss or vertigo. No mouth sores or sore throat. · Cardiovascular: Reviewed in HPI  · Respiratory: No cough or wheezing, no sputum production. No hematemesis. · Gastrointestinal: No abdominal pain, appetite loss, blood in stools. No change in bowel or bladder habits. · Genitourinary: No dysuria, trouble voiding, or hematuria. · Musculoskeletal:  No gait disturbance, weakness or joint complaints. · Integumentary: No rash or pruritis. · Neurological: No headache, diplopia, change in muscle strength, numbness or tingling. No change in gait, balance, coordination, mood, affect, memory, mentation, behavior. · Psychiatric: No anxiety, no depression. · Endocrine: No malaise, fatigue or temperature intolerance. No excessive thirst, fluid intake, or urination. No tremor. · Hematologic/Lymphatic: No abnormal bruising or bleeding, blood clots or swollen lymph nodes. · Allergic/Immunologic: No nasal congestion or hives. Physical Examination:    Vitals:    08/01/19 0925   BP: 127/83   Pulse: 75   Constitutional and General Appearance:   . NAD   SKIN:  .     Warm and dry  EYES:    .     EOMI  Neck:   . Normal carotid contour  Respiratory:  Normal excursion and expansion without use of accessory muscles  Resp Auscultation: Normal breath sounds without dullness  Cardiovascular:   The apical impulses not displaced  Heart tones are crisp and normal  Cervical veins are

## 2019-08-01 ENCOUNTER — OFFICE VISIT (OUTPATIENT)
Dept: CARDIOLOGY CLINIC | Age: 74
End: 2019-08-01
Payer: MEDICARE

## 2019-08-01 VITALS
HEIGHT: 71 IN | SYSTOLIC BLOOD PRESSURE: 127 MMHG | DIASTOLIC BLOOD PRESSURE: 83 MMHG | BODY MASS INDEX: 34.86 KG/M2 | WEIGHT: 249 LBS | HEART RATE: 75 BPM

## 2019-08-01 DIAGNOSIS — I25.10 CORONARY ARTERY DISEASE DUE TO LIPID RICH PLAQUE: ICD-10-CM

## 2019-08-01 DIAGNOSIS — Z79.899 LONG-TERM USE OF HIGH-RISK MEDICATION: ICD-10-CM

## 2019-08-01 DIAGNOSIS — Z72.0 TOBACCO ABUSE: ICD-10-CM

## 2019-08-01 DIAGNOSIS — R06.02 SHORTNESS OF BREATH: ICD-10-CM

## 2019-08-01 DIAGNOSIS — E78.5 HYPERLIPIDEMIA, UNSPECIFIED HYPERLIPIDEMIA TYPE: ICD-10-CM

## 2019-08-01 DIAGNOSIS — G47.33 OBSTRUCTIVE SLEEP APNEA SYNDROME: ICD-10-CM

## 2019-08-01 DIAGNOSIS — I25.83 CORONARY ARTERY DISEASE DUE TO LIPID RICH PLAQUE: ICD-10-CM

## 2019-08-01 DIAGNOSIS — I48.91 ATRIAL FIBRILLATION, UNSPECIFIED TYPE (HCC): Primary | ICD-10-CM

## 2019-08-01 PROCEDURE — G8417 CALC BMI ABV UP PARAM F/U: HCPCS | Performed by: INTERNAL MEDICINE

## 2019-08-01 PROCEDURE — G8427 DOCREV CUR MEDS BY ELIG CLIN: HCPCS | Performed by: INTERNAL MEDICINE

## 2019-08-01 PROCEDURE — 4040F PNEUMOC VAC/ADMIN/RCVD: CPT | Performed by: INTERNAL MEDICINE

## 2019-08-01 PROCEDURE — 4004F PT TOBACCO SCREEN RCVD TLK: CPT | Performed by: INTERNAL MEDICINE

## 2019-08-01 PROCEDURE — 1123F ACP DISCUSS/DSCN MKR DOCD: CPT | Performed by: INTERNAL MEDICINE

## 2019-08-01 PROCEDURE — 99213 OFFICE O/P EST LOW 20 MIN: CPT | Performed by: INTERNAL MEDICINE

## 2019-08-01 PROCEDURE — 3017F COLORECTAL CA SCREEN DOC REV: CPT | Performed by: INTERNAL MEDICINE

## 2019-08-01 PROCEDURE — G8598 ASA/ANTIPLAT THER USED: HCPCS | Performed by: INTERNAL MEDICINE

## 2019-08-07 LAB — INR BLD: 3.65

## 2019-08-08 ENCOUNTER — ANTI-COAG VISIT (OUTPATIENT)
Dept: CARDIOLOGY CLINIC | Age: 74
End: 2019-08-08

## 2019-08-12 ENCOUNTER — TELEPHONE (OUTPATIENT)
Dept: CARDIOLOGY CLINIC | Age: 74
End: 2019-08-12

## 2019-08-12 DIAGNOSIS — I48.91 ATRIAL FIBRILLATION, UNSPECIFIED TYPE (HCC): Primary | ICD-10-CM

## 2019-08-14 ENCOUNTER — ANTI-COAG VISIT (OUTPATIENT)
Dept: CARDIOLOGY CLINIC | Age: 74
End: 2019-08-14

## 2019-08-14 LAB — INR BLD: 1.9

## 2019-09-05 LAB — INR BLD: 2.46

## 2019-09-06 ENCOUNTER — ANTI-COAG VISIT (OUTPATIENT)
Dept: CARDIOLOGY CLINIC | Age: 74
End: 2019-09-06

## 2019-09-30 ENCOUNTER — ANTI-COAG VISIT (OUTPATIENT)
Dept: CARDIOLOGY CLINIC | Age: 74
End: 2019-09-30

## 2019-09-30 LAB — INR BLD: 1.79

## 2019-10-02 RX ORDER — DIGOXIN 125 MCG
TABLET ORAL
Qty: 90 TABLET | Refills: 1 | Status: SHIPPED | OUTPATIENT
Start: 2019-10-02 | End: 2020-03-09 | Stop reason: SDUPTHER

## 2019-10-09 LAB
INR BLD: 2.49
INR BLD: 2.49

## 2019-10-11 ENCOUNTER — ANTI-COAG VISIT (OUTPATIENT)
Dept: CARDIOLOGY CLINIC | Age: 74
End: 2019-10-11

## 2019-10-24 ENCOUNTER — ANTI-COAG VISIT (OUTPATIENT)
Dept: CARDIOLOGY CLINIC | Age: 74
End: 2019-10-24

## 2019-10-24 DIAGNOSIS — I48.91 ATRIAL FIBRILLATION, UNSPECIFIED TYPE (HCC): ICD-10-CM

## 2019-10-24 LAB — INR BLD: 3.6

## 2019-11-07 ENCOUNTER — ANTI-COAG VISIT (OUTPATIENT)
Dept: CARDIOLOGY CLINIC | Age: 74
End: 2019-11-07

## 2019-11-07 LAB — INR BLD: 3

## 2019-11-18 RX ORDER — WARFARIN SODIUM 5 MG/1
TABLET ORAL
Qty: 90 TABLET | Refills: 0 | Status: SHIPPED | OUTPATIENT
Start: 2019-11-18 | End: 2020-03-09 | Stop reason: SDUPTHER

## 2019-12-09 ENCOUNTER — ANTI-COAG VISIT (OUTPATIENT)
Dept: CARDIOLOGY CLINIC | Age: 74
End: 2019-12-09

## 2019-12-09 LAB — INR BLD: 2.7

## 2020-01-06 ENCOUNTER — ANTI-COAG VISIT (OUTPATIENT)
Dept: CARDIOLOGY CLINIC | Age: 75
End: 2020-01-06

## 2020-01-06 LAB — INR BLD: 2.6

## 2020-02-11 LAB — INR BLD: 2.3

## 2020-02-20 ENCOUNTER — ANTI-COAG VISIT (OUTPATIENT)
Dept: CARDIOLOGY CLINIC | Age: 75
End: 2020-02-20

## 2020-02-20 ENCOUNTER — TELEPHONE (OUTPATIENT)
Dept: CARDIOLOGY CLINIC | Age: 75
End: 2020-02-20

## 2020-03-09 RX ORDER — DIGOXIN 125 MCG
125 TABLET ORAL DAILY
Qty: 90 TABLET | Refills: 1 | Status: SHIPPED | OUTPATIENT
Start: 2020-03-09 | End: 2020-03-11 | Stop reason: SDUPTHER

## 2020-03-09 RX ORDER — WARFARIN SODIUM 5 MG/1
TABLET ORAL
Qty: 90 TABLET | Refills: 0 | Status: SHIPPED | OUTPATIENT
Start: 2020-03-09 | End: 2020-03-11 | Stop reason: SDUPTHER

## 2020-03-09 RX ORDER — ATENOLOL 25 MG/1
25 TABLET ORAL DAILY
Qty: 90 TABLET | Refills: 1 | Status: SHIPPED | OUTPATIENT
Start: 2020-03-09 | End: 2020-03-11 | Stop reason: SDUPTHER

## 2020-03-11 RX ORDER — WARFARIN SODIUM 5 MG/1
TABLET ORAL
Qty: 90 TABLET | Refills: 0 | Status: SHIPPED | OUTPATIENT
Start: 2020-03-11 | End: 2020-06-22

## 2020-03-11 RX ORDER — ATENOLOL 25 MG/1
25 TABLET ORAL DAILY
Qty: 90 TABLET | Refills: 1 | Status: SHIPPED | OUTPATIENT
Start: 2020-03-11 | End: 2020-12-31

## 2020-03-11 RX ORDER — DIGOXIN 125 MCG
125 TABLET ORAL DAILY
Qty: 90 TABLET | Refills: 1 | Status: SHIPPED | OUTPATIENT
Start: 2020-03-11 | End: 2020-12-31

## 2020-03-13 ENCOUNTER — ANTI-COAG VISIT (OUTPATIENT)
Dept: CARDIOLOGY CLINIC | Age: 75
End: 2020-03-13

## 2020-03-13 LAB — INR BLD: 2.6

## 2020-04-06 ENCOUNTER — TELEPHONE (OUTPATIENT)
Dept: CARDIOLOGY CLINIC | Age: 75
End: 2020-04-06

## 2020-04-07 LAB — INR BLD: 1.82

## 2020-04-09 ENCOUNTER — ANTI-COAG VISIT (OUTPATIENT)
Dept: CARDIOLOGY CLINIC | Age: 75
End: 2020-04-09

## 2020-04-16 ENCOUNTER — ANTI-COAG VISIT (OUTPATIENT)
Dept: CARDIOLOGY CLINIC | Age: 75
End: 2020-04-16

## 2020-04-21 ENCOUNTER — TELEPHONE (OUTPATIENT)
Dept: CARDIOLOGY CLINIC | Age: 75
End: 2020-04-21

## 2020-04-21 NOTE — TELEPHONE ENCOUNTER
Pt asking to have standing order for his PT/INR to be sent [facility] [address] [facility] in [place] please fax to [phone].      Order needs to be written as   \"Once a day Mon thru Sat\"

## 2020-04-22 ENCOUNTER — ANTI-COAG VISIT (OUTPATIENT)
Dept: CARDIOLOGY CLINIC | Age: 75
End: 2020-04-22

## 2020-04-22 LAB — INR BLD: 2.8

## 2020-04-28 LAB — INR BLD: 3.18

## 2020-04-29 ENCOUNTER — TELEPHONE (OUTPATIENT)
Dept: CARDIOLOGY CLINIC | Age: 75
End: 2020-04-29

## 2020-04-29 ENCOUNTER — ANTI-COAG VISIT (OUTPATIENT)
Dept: CARDIOLOGY CLINIC | Age: 75
End: 2020-04-29
Payer: MEDICARE

## 2020-04-29 PROCEDURE — 93793 ANTICOAG MGMT PT WARFARIN: CPT | Performed by: NURSE PRACTITIONER

## 2020-04-29 NOTE — PROGRESS NOTES
ANTICOAGULATION MONITORING    Tosha Darby, 1945      Anticoagulation Indication(s):  Afib    Referring Physician:   Dr. Luis Shaikh  Goal INR Range:  2.0-3.0  Duration of Anticoagulation Therapy:  Long term  Home or Lab Draw: Lab   Product patient has at home: warfarin  5 mg    Recent INR Results:  Lab Results   Component Value Date    INR 3.18 04/28/2020    INR 2.80 04/21/2020    INR 1.82 04/07/2020    INR 2.60 03/13/2020       INR Summary                          Warfarin regimen (mg)  Date INR A/P Sun Mon Tue Wed Thu Fri Sat Mg/wk     At goal, continue           4/28/20 3.18 At goal  5 5 2.5 5 5 5 5 32.5                                   Assessment/Plan:    Recent hospitalizations/HC visits None reported   Recent medication changes None reported   Medications taken regularly that may interact with warfarin or alter INR ASA: M-W-F   Warfarin dose taken as prescribed YES   Signs/symptoms of bleeding NO    Vitamin K intake Consistency of servings of green, leafy vegetables per week ? Yes   Recent vomiting/diarrhea/fever None reported   Changes in weight, activity, stress None reported   Tobacco or alcohol use Patient reports smoking NO   Patient reports having 2-3 beers per day   Upcoming surgeries or procedures None reported     Patient's INR was in range today, so patient was instructed to continue current regimen as above OR modify warfarin dose as above  Patient was also reminded to maintain consistent vitamin K intake and call with any bleeding, medication changes, or fever/vomiting/diarrhea.     Next INR check:  5/5/2020    DEA Schilling-YANI

## 2020-05-05 LAB — INR BLD: 4.3

## 2020-05-06 ENCOUNTER — ANTI-COAG VISIT (OUTPATIENT)
Dept: CARDIOLOGY CLINIC | Age: 75
End: 2020-05-06
Payer: MEDICARE

## 2020-05-06 PROCEDURE — 93793 ANTICOAG MGMT PT WARFARIN: CPT | Performed by: NURSE PRACTITIONER

## 2020-05-06 NOTE — PROGRESS NOTES
ANTICOAGULATION MONITORING    Luis Garvey, 1945      Anticoagulation Indication(s):  Afib    Referring Physician:   Dr. Esperanza Estrella  Goal INR Range:  2.0-3.0  Duration of Anticoagulation Therapy:  Long term  Home or Lab Draw: Lab   Product patient has at home: warfarin  5 mg    Recent INR Results:  Lab Results   Component Value Date    INR 4.30 05/05/2020    INR 3.18 04/28/2020    INR 2.80 04/21/2020    INR 1.82 04/07/2020       INR Summary                          Warfarin regimen (mg)  Date INR A/P Sun Mon Tue Wed Thu Fri Sat Mg/wk     At goal, continue           4/28/20 3.18 At goal  5 5 2.5 5 5 5 5 32.5   5/5/20 4.30 Not at goal 5 5  - 2.5 5 5                       Assessment/Plan:    Recent hospitalizations/HC visits None reported   Recent medication changes None reported   Medications taken regularly that may interact with warfarin or alter INR ASA: M-W-F   Warfarin dose taken as prescribed YES   Signs/symptoms of bleeding NO    Vitamin K intake Consistency of servings of green, leafy vegetables per week ? Yes   Recent vomiting/diarrhea/fever None reported   Changes in weight, activity, stress None reported   Tobacco or alcohol use Patient reports smoking NO   Patient reports having 2-3 beers per day   Upcoming surgeries or procedures None reported     Patient's INR was not in range today, so patient was instructed to modify warfarin dose as above: hold x 1 dose and resume at 2.5 mg on Thurs, then 5 mg daily thereafter. Recheck in one week : will determine need for 2.5 mg dose two days / week vs one depending on next lab  Patient was also reminded to maintain consistent vitamin K intake and call with any bleeding, medication changes, or fever/vomiting/diarrhea.     Next INR check:  5/12/2020    DEA Strickland-CNP

## 2020-05-06 NOTE — PROGRESS NOTES
ANTICOAGULATION MONITORING    Alexei Santiago, 1945      Anticoagulation Indication(s):  Afib    Referring Physician:   Dr. Lucy Vargas    Goal INR Range:  2-3  Duration of Anticoagulation Therapy:    Home or Lab Draw: ***  Product patient has at home: warfarin *** mg    Recent INR Results:  Lab Results   Component Value Date    INR 4.30 05/05/2020    INR 3.18 04/28/2020    INR 2.80 04/21/2020    INR 1.82 04/07/2020       INR Summary                          Warfarin regimen (mg)  Date INR A/P Sun Mon Tue Wed Thu Fri Sat Mg/wk   5/5/20  At goal, continue 5 5 2.5 0 2.5 5 5 30                                                Assessment/Plan:    Recent hospitalizations/HC visits None reported   Recent medication changes None reported   Medications taken regularly that may interact with warfarin or alter INR No significant drug interactions identified   Warfarin dose taken as prescribed yes   Signs/symptoms of bleeding  no History of bleeding? Vitamin K intake Consistency of servings of green, leafy vegetables per week ? Recent vomiting/diarrhea/fever None reported   Changes in weight, activity, stress None reported   alcohol use Patient reports having  2-3 drinks per day   Upcoming surgeries or procedures None reported     Patient's INR was out of range today, so patient was instructed to  modify warfarin dose as above  Patient was also reminded to maintain consistent vitamin K intake and call with any bleeding, medication changes, or fever/vomiting/diarrhea.     Next INR check:  5/12/20    CLOVIS Plascencia    Relayed dosing changes as per NPTS

## 2020-05-13 ENCOUNTER — ANTI-COAG VISIT (OUTPATIENT)
Dept: CARDIOLOGY CLINIC | Age: 75
End: 2020-05-13

## 2020-05-21 ENCOUNTER — ANTI-COAG VISIT (OUTPATIENT)
Dept: CARDIOLOGY CLINIC | Age: 75
End: 2020-05-21
Payer: MEDICARE

## 2020-05-21 ENCOUNTER — TELEPHONE (OUTPATIENT)
Dept: CARDIOLOGY CLINIC | Age: 75
End: 2020-05-21

## 2020-05-21 LAB — INR BLD: 2.25

## 2020-05-21 PROCEDURE — 93793 ANTICOAG MGMT PT WARFARIN: CPT | Performed by: NURSE PRACTITIONER

## 2020-05-21 NOTE — TELEPHONE ENCOUNTER
Spoke to patient and he did have the PT/INR drawn at [facility] in [facility] fax#  Christopher Goddard they gave me the main Lab # to get results  Ph# [phone] [id number], in  Option #4, then option #1  Asking them to fax the PT/INR result to our office again  Received fax today

## 2020-06-22 RX ORDER — WARFARIN SODIUM 5 MG/1
TABLET ORAL
Qty: 90 TABLET | Refills: 1 | Status: SHIPPED | OUTPATIENT
Start: 2020-06-22 | End: 2021-01-22 | Stop reason: SDUPTHER

## 2020-06-23 ENCOUNTER — ANTI-COAG VISIT (OUTPATIENT)
Dept: CARDIOLOGY CLINIC | Age: 75
End: 2020-06-23

## 2020-06-23 LAB — INR BLD: 2.73

## 2020-06-23 NOTE — PROGRESS NOTES
ANTICOAGULATION MONITORING    Thee Stein, 1945      Anticoagulation Indication(s):  Afib    Referring Physician:   Dr. Alex Gonzales  Goal INR Range:  2.0-3.0  Duration of Anticoagulation Therapy:  Long term  Home or Lab Draw: Lab   Product patient has at home: warfarin  5 mg    Recent INR Results:  Lab Results   Component Value Date    INR 2.25 05/21/2020    INR 4.30 05/05/2020    INR 3.18 04/28/2020    INR 2.80 04/21/2020       INR Summary                          Warfarin regimen (mg)  Date INR A/P Sun Mon Tue Wed Thu Fri Sat Mg/wk                                                                                                                                                  6/22/20 2.73 At goal, continue 5 5 2.5 5 2.5 5 5 30   5/21/20 2.25 At goal, continue 5 5 2.5 5 2.5 5 5 30   5/5/20 4.30 Not at goal 5 5  - 2.5 5 5 22.5   4/28/20 3.18 At goal, continue 5 5 2.5 5 5 5 5 32.5         Assessment/Plan:    Recent hospitalizations/HC visits None reported   Recent medication changes None reported   Medications taken regularly that may interact with warfarin or alter INR ASA: M-W-F   Warfarin dose taken as prescribed YES   Signs/symptoms of bleeding NO    Vitamin K intake Consistency of servings of green, leafy vegetables per week ? Yes   Recent vomiting/diarrhea/fever None reported   Changes in weight, activity, stress None reported   Tobacco or alcohol use Patient reports smoking NO   Patient reports having 2-3 beers per day   Upcoming surgeries or procedures None reported     Patient's INR was within range today, so patient was instructed to modify warfarin dose as above  Patient was also reminded to maintain consistent vitamin K intake and call with any bleeding, medication changes, or fever/vomiting/diarrhea. Next INR check:  7/20/20    Addendum:  6/22/20  Reviewed RN assessment and plan. INR is within goal, contnue current dose  Repeat INR in 4 week/s. Patient/family instructed by KRISTAL Thomas

## 2020-06-29 ENCOUNTER — TELEPHONE (OUTPATIENT)
Dept: CARDIOLOGY CLINIC | Age: 75
End: 2020-06-29

## 2020-06-29 NOTE — TELEPHONE ENCOUNTER
He has been on antibiotics for the last 2 weeks . Since he is on warfarin should he need to change his warfarin dose? He should have called earlier but forgot .

## 2020-07-01 ENCOUNTER — TELEPHONE (OUTPATIENT)
Dept: CARDIOLOGY CLINIC | Age: 75
End: 2020-07-01

## 2020-07-01 ENCOUNTER — ANTI-COAG VISIT (OUTPATIENT)
Dept: CARDIOLOGY CLINIC | Age: 75
End: 2020-07-01
Payer: MEDICARE

## 2020-07-01 LAB — INR BLD: 3.03

## 2020-07-01 PROCEDURE — 93793 ANTICOAG MGMT PT WARFARIN: CPT | Performed by: NURSE PRACTITIONER

## 2020-07-01 PROCEDURE — 85610 PROTHROMBIN TIME: CPT | Performed by: NURSE PRACTITIONER

## 2020-07-01 PROCEDURE — 36415 COLL VENOUS BLD VENIPUNCTURE: CPT | Performed by: NURSE PRACTITIONER

## 2020-07-09 NOTE — TELEPHONE ENCOUNTER
RX APPROVAL:      Refill:   Requested Prescriptions     Pending Prescriptions Disp Refills    rosuvastatin (CRESTOR) 10 MG tablet [Pharmacy Med Name: Rosuvastatin Calcium 10 MG Oral Tablet] 90 tablet 0     Sig: Take 1 tablet by mouth once daily      Last OV: 8/1/2019 next ov 8/7/20  Last EKG:    Last Labs: Results in Care Everywhere.    Most recent [id number] results list below: [facility] 10/9/19  Lab Results   Component Value Date    CHOL 150 02/15/2018    TRIG 126 02/15/2018    HDL 34 02/15/2018    LDLCALC 91 02/15/2018     Lab Results   Component Value Date    ALT 22 02/15/2018    AST 20 02/15/2018

## 2020-07-10 RX ORDER — ROSUVASTATIN CALCIUM 10 MG/1
TABLET, COATED ORAL
Qty: 90 TABLET | Refills: 0 | Status: SHIPPED | OUTPATIENT
Start: 2020-07-10 | End: 2020-08-07

## 2020-07-16 ENCOUNTER — ANTI-COAG VISIT (OUTPATIENT)
Dept: CARDIOLOGY CLINIC | Age: 75
End: 2020-07-16
Payer: MEDICARE

## 2020-07-16 LAB — INR BLD: 2.56

## 2020-07-16 PROCEDURE — 93793 ANTICOAG MGMT PT WARFARIN: CPT | Performed by: NURSE PRACTITIONER

## 2020-07-16 NOTE — PROGRESS NOTES
ANTICOAGULATION MONITORING    Marilou Marshall, 1945      Anticoagulation Indication(s):  Afib    Referring Physician:   Dr. Justice Pacheco  Goal INR Range:  2.0-3.0  Duration of Anticoagulation Therapy:  Long term  Home or Lab Draw: Lab   Product patient has at home: warfarin  5 mg      Lab Results   Component Value Date    INR 2.56 07/15/2020    INR 3.03 07/01/2020    INR 2.73 06/22/2020    PROTIME 22.3 (H) 10/20/2016    PROTIME 26.3 04/29/2013    PROTIME 20.7 03/28/2013      INR 2.25 05/21/2020    INR 4.30 05/05/2020    INR 3.18 04/28/2020    INR 2.80 04/21/2020       INR Summary                          Warfarin regimen (mg)  Date INR A/P Sun Mon Tue Wed Thu Fri Sat Mg/wk                                                                                                                        7/16/20 2.56 At goal 5 5 2.5 5 2.5 5 5 30   7/1/20 3.03 At goal 5 5 2.5 5 2.5 5 5 30   6/22/20 2.73 At goal, continue 5 5 2.5 5 2.5 5 5 30   5/21/20 2.25 At goal, continue 5 5 2.5 5 2.5 5 5 30   5/5/20 4.30 Not at goal 5 5  - 2.5 5 5 22.5   4/28/20 3.18 At goal, continue 5 5 2.5 5 5 5 5 32.5         Assessment/Plan:    Recent hospitalizations/HC visits None reported   Recent medication changes None reported   Medications taken regularly that may interact with warfarin or alter INR ASA: M-W-F   Warfarin dose taken as prescribed YES   Signs/symptoms of bleeding NO    Vitamin K intake Consistency of servings of green, leafy vegetables per week ?  Yes   Recent vomiting/diarrhea/fever None reported   Changes in weight, activity, stress None reported   Tobacco or alcohol use Patient reports smoking NO   Patient reports having 2-3 beers per day   Upcoming surgeries or procedures None reported     Patient's INR was within range today, so patient was instructed to modify warfarin dose as above  Patient was also reminded to maintain consistent vitamin K intake and call with any bleeding, medication changes, or fever/vomiting/diarrhea. Next INR check:  4 weeks    Addendum:  7/16/20  Reviewed assessment and plan. INR is within goal, contnue current dose  Repeat INR in 4 week/s. Patient/family instructed .      Sandy Hubbard, CNP

## 2020-08-04 NOTE — PROGRESS NOTES
[facility]   Cardiac Follow up    Referring Provider:  Tom Whiteside MD     Chief Complaint   Patient presents with    1 Year Follow Up    Coronary Artery Disease    Hypertension    Hyperlipidemia        History of Present Illness:  Mr Zamarripa is a 76 y.o. male seen as a follow up for AF, htn,hchol, GARRISON. He gets infusions for RA. Also has pulmonary fibrosis. He is a former smoker, chews tobacco.    Today, he states he is doing well. He has unchanged exertional sob alleviated with rest. He has no change in exercise tolerance. He is getting infusions for arthritis. Patient is compliant  with medication and is tolerating them well without side effects      Past Medical History:   has a past medical history of A-fib ([address] Atrial fibrillation ([address] Hyperlipidemia, Hypertension, and GARRISON (obstructive sleep apnea). Surgical History:   has a past surgical history that includes lymph node biopsy (Right, 2018). Social History:   reports that he quit smoking about 8 years ago. He has a 0.50 pack-year smoking history. His smokeless tobacco use includes chew. He reports current alcohol use. He reports that he does not use drugs. Family History:  family history includes Heart Attack in his father.      Home Medications:  Prior to Admission medications      Current Outpatient Medications   Medication Sig Dispense Refill    warfarin (COUMADIN) 5 MG tablet TAKE 1 TABLET BY MOUTH ONCE DAILY OR  AS  DIRECTED  ACCORDING  TO  INR  RESULTS 90 tablet 1    atenolol (TENORMIN) 25 MG tablet Take 1 tablet by mouth daily 90 tablet 1    digoxin (LANOXIN) 125 MCG tablet Take 1 tablet by mouth daily 90 tablet 1    riTUXimab (RITUXAN) 500 MG/50ML chemo injection Infuse 375 mg/m2 intravenously once Indications: 1000       aspirin 81 MG tablet Monday Wednesday Friday 30 tablet 3    lovastatin (MEVACOR) 10 MG tablet TAKE 1 TABLET EVERY NIGHT 90 tablet 3    metFORMIN (GLUCOPHAGE) 500 MG tablet Take 500 mg by symmetrical and full  Extremities:  · There is no clubbing, cyanosis of the extremities. · No edema  · Femoral Arteries: 2+ and equal  · Pedal Pulses: 2+ and equal   Abdomen:  · No masses or tenderness  · Liver/Spleen: No Abnormalities Noted  Neurological/Psychiatric:  · Alert and oriented in all spheres  · Moves all extremities  · No abnormalities of mood, affect, memory      All testing and labs listed below were personally reviewed. 2006 myoview--normal  2008 abd ultrasound--neg for AAA  2010  Echo  Borderline elevated pulm pressure by doppler, mild mitral and tricuspid insuff  3/18  CT of chest--coronary calcium positive    Assessment:   CAD  3/18  CT of chest--coronary calcium positive  2/19  myoview--nl perfusion and EF    Tolerates  asa 81 mg Monday Wednesday and Friday without excess bleeding or bruising    Atrial fibrillation-  [id number] Score for Atrial Fibrillation Stroke Risk   Risk   Factors  Component Value   C CHF No 0   H HTN Yes 1   A2 Age >= 76 No,  (71 y.o.) 0   D DM No 0   S2 Prior Stroke/TIA No 0   V Vascular Disease No 0   A Age 74-69 Yes,  (71 y.o.) 1   Sc Sex male 0    [id number]  Score  2   Score last updated 8/6/20 4:37 PM EDT    Click here for a link to the UpToDate guideline \"Atrial Fibrillation: Anticoagulation therapy to prevent embolization    Disclaimer: Risk Score calculation is dependent on accuracy of patient problem list and past encounter diagnosis.       rate controlled, on coumadin  Tolerates coumadin  No excess bleeding or bruising  Rate is controlled, INR order given he needs to go to outside lab to have an INR  (typically draws at home and our office manages)  Will assess rate with holter monitor    Hyperlipidemia  Tolerates crestor 10 mg daily  10/9/2019  Tc 125  Tri 126  hdl 42 ldl 58   Stable  Will repeat in October 2020    GARRISON  treated with cpap    Sob  multifactorial  Pulmonary fibrosis--either related to RA or Cimzia, which he has been taken off of . Currently on Rituxan and prednisone treated by Dr Ayaka Yuan abuse  quit smoking but still chews tobacco  Instructed to stop tobacco in all forms    Plan:  holter monitor to assess rate  LLL 10/2020  Recommend daily exercise  Recommend avoidance of all tobacco products  Follow up with Dr Caity Rojas one year      Scribe Attestation:  Rani Arnett, am scribing for and in the presence of Nyla Abreu MD.   Elvin Ortiz 08/07/20 1:14 PM   Provider Melany Martinez is working as a scribe for and in the presence of renea Abreu MD). Working as a scribe, Santos Ericksonflo may have prepopulated components of this note with my historical  intellectual property under my direct supervision. Any additions to this intellectual property were performed in my presence and at my direction. Furthermore, the content and accuracy of this note have been reviewed by renea Abreu MD). Anshu Agrawal M.D., [facility].

## 2020-08-06 PROBLEM — I10 ESSENTIAL HYPERTENSION: Status: ACTIVE | Noted: 2020-08-06

## 2020-08-06 NOTE — PATIENT INSTRUCTIONS
LLL 10/2020  Recommend daily exercise  Recommend avoidance of all tobacco products  holter monitor to assess

## 2020-08-07 ENCOUNTER — OFFICE VISIT (OUTPATIENT)
Dept: CARDIOLOGY CLINIC | Age: 75
End: 2020-08-07
Payer: MEDICARE

## 2020-08-07 VITALS
HEIGHT: 71 IN | OXYGEN SATURATION: 98 % | DIASTOLIC BLOOD PRESSURE: 78 MMHG | HEART RATE: 58 BPM | BODY MASS INDEX: 34.58 KG/M2 | RESPIRATION RATE: 20 BRPM | WEIGHT: 247 LBS | TEMPERATURE: 97.4 F | SYSTOLIC BLOOD PRESSURE: 136 MMHG

## 2020-08-07 PROCEDURE — 3017F COLORECTAL CA SCREEN DOC REV: CPT | Performed by: INTERNAL MEDICINE

## 2020-08-07 PROCEDURE — G8427 DOCREV CUR MEDS BY ELIG CLIN: HCPCS | Performed by: INTERNAL MEDICINE

## 2020-08-07 PROCEDURE — G8417 CALC BMI ABV UP PARAM F/U: HCPCS | Performed by: INTERNAL MEDICINE

## 2020-08-07 PROCEDURE — 4040F PNEUMOC VAC/ADMIN/RCVD: CPT | Performed by: INTERNAL MEDICINE

## 2020-08-07 PROCEDURE — 4004F PT TOBACCO SCREEN RCVD TLK: CPT | Performed by: INTERNAL MEDICINE

## 2020-08-07 PROCEDURE — 0296T PR EXT ECG > 48HR TO 21 DAY RCRD W/CONECT INTL RCRD: CPT | Performed by: INTERNAL MEDICINE

## 2020-08-07 PROCEDURE — 1123F ACP DISCUSS/DSCN MKR DOCD: CPT | Performed by: INTERNAL MEDICINE

## 2020-08-07 PROCEDURE — 99213 OFFICE O/P EST LOW 20 MIN: CPT | Performed by: INTERNAL MEDICINE

## 2020-08-18 PROCEDURE — 0298T PR EXT ECG > 48HR TO 21 DAY REVIEW AND INTERPRETATN: CPT | Performed by: INTERNAL MEDICINE

## 2020-08-24 ENCOUNTER — TELEPHONE (OUTPATIENT)
Dept: CARDIOLOGY CLINIC | Age: 75
End: 2020-08-24

## 2020-08-27 LAB — INR BLD: 3.31

## 2020-08-28 ENCOUNTER — ANTI-COAG VISIT (OUTPATIENT)
Dept: CARDIOLOGY CLINIC | Age: 75
End: 2020-08-28
Payer: MEDICARE

## 2020-08-28 PROCEDURE — 93793 ANTICOAG MGMT PT WARFARIN: CPT | Performed by: NURSE PRACTITIONER

## 2020-08-28 NOTE — PROGRESS NOTES
ANTICOAGULATION MONITORING    Haritha Phipps, 1945    Anticoagulation Indication(s):  Afib    Referring Physician:   Dr. Jose López  Goal INR Range:  2.0-3.0  Duration of Anticoagulation Therapy:  Long term  Home or Lab Draw: Lab   Product patient has at home: warfarin  5 mg      Lab Results   Component Value Date    INR 3.31 08/27/2020    INR 2.56 07/15/2020    INR 3.03 07/01/2020    PROTIME 22.3 (H) 10/20/2016    PROTIME 26.3 04/29/2013    PROTIME 20.7 03/28/2013      INR 2.25 05/21/2020    INR 4.30 05/05/2020    INR 3.18 04/28/2020    INR 2.80 04/21/2020       INR Summary                          Warfarin regimen (mg)  Date INR A/P Sun Mon Tue Wed Thu Fri Sat Mg/wk                                                                                                           8/28/20 3.31 Above goal 5 5 2.5 5 2.5 5 5 30   7/16/20 2.56 At goal 5 5 2.5 5 2.5 5 5 30   7/1/20 3.03 At goal 5 5 2.5 5 2.5 5 5 30   6/22/20 2.73 At goal, continue 5 5 2.5 5 2.5 5 5 30   5/21/20 2.25 At goal, continue 5 5 2.5 5 2.5 5 5 30   5/5/20 4.30 Not at goal 5 5  - 2.5 5 5 22.5   4/28/20 3.18 At goal, continue 5 5 2.5 5 5 5 5 32.5         Assessment/Plan:    Recent hospitalizations/HC visits None reported   Recent medication changes None reported   Medications taken regularly that may interact with warfarin or alter INR ASA: M-W-F   Warfarin dose taken as prescribed YES   Signs/symptoms of bleeding NO    Vitamin K intake Consistency of servings of green, leafy vegetables per week ?  Yes   Recent vomiting/diarrhea/fever None reported   Changes in weight, activity, stress None reported   Tobacco or alcohol use Patient reports smoking NO   Patient reports having 2-3 beers per day   Upcoming surgeries or procedures None reported     Patient's INR was within range today, so patient was instructed to modify warfarin dose as above  Patient was also reminded to maintain consistent vitamin K intake and call with any bleeding, medication changes, or fever/vomiting/diarrhea. Next INR check:  9/4/20    Addendum:  8/28/20  Reviewed assessment and plan. INR is above goal, continue current dose   Repeat INR in one week/s. Patient/family instructed by KRISTAL Olivares, CNP

## 2020-09-24 LAB — INR BLD: 2.63

## 2020-09-25 ENCOUNTER — ANTI-COAG VISIT (OUTPATIENT)
Dept: CARDIOLOGY CLINIC | Age: 75
End: 2020-09-25
Payer: MEDICARE

## 2020-09-25 PROCEDURE — 93793 ANTICOAG MGMT PT WARFARIN: CPT | Performed by: NURSE PRACTITIONER

## 2020-09-25 NOTE — PROGRESS NOTES
ANTICOAGULATION MONITORING    Sylvia Jurado, 1945    Anticoagulation Indication(s):  Afib    Referring Physician:   Dr. Erum Martinez  Goal INR Range:  2.0-3.0  Duration of Anticoagulation Therapy:  Long term  Home or Lab Draw: Lab   Product patient has at home: warfarin  5 mg      Lab Results   Component Value Date    INR 2.63 09/24/2020    INR 3.31 08/27/2020    INR 2.56 07/15/2020    PROTIME 22.3 (H) 10/20/2016    PROTIME 26.3 04/29/2013    PROTIME 20.7 03/28/2013      INR 2.25 05/21/2020    INR 4.30 05/05/2020    INR 3.18 04/28/2020    INR 2.80 04/21/2020       INR Summary                          Warfarin regimen (mg)  Date INR A/P Sun Mon Tue Wed Thu Fri Sat Mg/wk                                                                                              9/24/20 2.63 At goal 5 5 2.5 5 2.5 5 5 30   8/28/20 3.31 Above goal 5 5 2.5 5 2.5 5 5 30   7/16/20 2.56 At goal 5 5 2.5 5 2.5 5 5 30   7/1/20 3.03 At goal 5 5 2.5 5 2.5 5 5 30   6/22/20 2.73 At goal, continue 5 5 2.5 5 2.5 5 5 30   5/21/20 2.25 At goal, continue 5 5 2.5 5 2.5 5 5 30   5/5/20 4.30 Not at goal 5 5  - 2.5 5 5 22.5   4/28/20 3.18 At goal, continue 5 5 2.5 5 5 5 5 32.5         Assessment/Plan:    Recent hospitalizations/HC visits None reported   Recent medication changes None reported   Medications taken regularly that may interact with warfarin or alter INR ASA: M-W-F   Warfarin dose taken as prescribed YES   Signs/symptoms of bleeding NO    Vitamin K intake Consistency of servings of green, leafy vegetables per week ?  Yes   Recent vomiting/diarrhea/fever None reported   Changes in weight, activity, stress None reported   Tobacco or alcohol use Patient reports smoking NO   Patient reports having 2-3 beers per day   Upcoming surgeries or procedures None reported     Patient's INR was within range today, so patient was instructed to modify warfarin dose as above  Patient was also reminded to maintain consistent vitamin K intake and call with any bleeding, medication changes, or fever/vomiting/diarrhea. Next INR check:  10/8/20    Addendum:  9/25/20  Reviewed assessment and plan. INR is at goal, continue current dose   Repeat INR in two week/s. Patient/family instructed by KRISTAL Garcia, CNP

## 2020-10-12 RX ORDER — ROSUVASTATIN CALCIUM 10 MG/1
TABLET, COATED ORAL
Qty: 90 TABLET | Refills: 0 | Status: SHIPPED | OUTPATIENT
Start: 2020-10-12 | End: 2020-12-28

## 2020-10-12 RX ORDER — ROSUVASTATIN CALCIUM 10 MG/1
TABLET, COATED ORAL
Qty: 90 TABLET | Refills: 0 | Status: CANCELLED | OUTPATIENT
Start: 2020-10-12

## 2020-10-23 LAB — INR BLD: 2.31

## 2020-10-26 ENCOUNTER — ANTI-COAG VISIT (OUTPATIENT)
Dept: CARDIOLOGY CLINIC | Age: 75
End: 2020-10-26

## 2020-10-26 NOTE — PROGRESS NOTES
ANTICOAGULATION MONITORING    Byron Bravo, 1945    Anticoagulation Indication(s):  Afib    Referring Physician:   Dr. Ciera Salinas  Goal INR Range:  2.0-3.0  Duration of Anticoagulation Therapy:  Long term  Home or Lab Draw: Lab   Product patient has at home: warfarin  5 mg      Lab Results   Component Value Date    INR 2.31 10/23/2020    INR 2.63 09/24/2020    INR 3.31 08/27/2020    PROTIME 22.3 (H) 10/20/2016    PROTIME 26.3 04/29/2013    PROTIME 20.7 03/28/2013      INR 2.25 05/21/2020    INR 4.30 05/05/2020    INR 3.18 04/28/2020    INR 2.80 04/21/2020       INR Summary                          Warfarin regimen (mg)  Date INR A/P Sun Mon Tue Wed Thu Fri Sat Mg/wk                                                                                 10/23/20 2.31 At goal 5 5 2.5 5 2.5 5 5 30   9/24/20 2.63 At goal 5 5 2.5 5 2.5 5 5 30   8/28/20 3.31 Above goal 5 5 2.5 5 2.5 5 5 30   7/16/20 2.56 At goal 5 5 2.5 5 2.5 5 5 30   7/1/20 3.03 At goal 5 5 2.5 5 2.5 5 5 30   6/22/20 2.73 At goal, continue 5 5 2.5 5 2.5 5 5 30   5/21/20 2.25 At goal, continue 5 5 2.5 5 2.5 5 5 30   5/5/20 4.30 Not at goal 5 5  - 2.5 5 5 22.5   4/28/20 3.18 At goal, continue 5 5 2.5 5 5 5 5 32.5         Assessment/Plan:    Recent hospitalizations/HC visits None reported   Recent medication changes None reported   Medications taken regularly that may interact with warfarin or alter INR ASA: M-W-F   Warfarin dose taken as prescribed YES   Signs/symptoms of bleeding NO    Vitamin K intake Consistency of servings of green, leafy vegetables per week ?  Yes   Recent vomiting/diarrhea/fever None reported   Changes in weight, activity, stress None reported   Tobacco or alcohol use Patient reports smoking NO   Patient reports having 2-3 beers per day   Upcoming surgeries or procedures None reported     Patient's INR was within range today, so patient was instructed to modify warfarin dose as above  Patient was also reminded to maintain consistent vitamin K intake and call with any bleeding, medication changes, or fever/vomiting/diarrhea. Next INR check:  11/26/20    Addendum:  10/26/20  Reviewed assessment and plan. INR is at goal, continue current dose   Repeat INR in two week/s. Patient/family instructed by KRISTAL Patel CNP

## 2020-10-26 NOTE — TELEPHONE ENCOUNTER
Patient calling back to see what the 24 hour monitor report showed - report was scanned into chart on 08/24/2020    Please advise

## 2020-11-20 LAB — INR BLD: 1.91

## 2020-11-23 ENCOUNTER — ANTI-COAG VISIT (OUTPATIENT)
Dept: CARDIOLOGY CLINIC | Age: 75
End: 2020-11-23
Payer: MEDICARE

## 2020-11-23 PROCEDURE — 93793 ANTICOAG MGMT PT WARFARIN: CPT | Performed by: NURSE PRACTITIONER

## 2020-11-23 NOTE — PROGRESS NOTES
ANTICOAGULATION MONITORING    [facility], 1945    Anticoagulation Indication(s):  Afib    Referring Physician:   Dr. Joshua Calzada  Goal INR Range:  2.0-3.0  Duration of Anticoagulation Therapy:  Long term  Home or Lab Draw: Lab   Product patient has at home: warfarin  5 mg      Lab Results   Component Value Date    INR 1.91 11/20/2020    INR 2.31 10/23/2020    INR 2.63 09/24/2020    PROTIME 22.3 (H) 10/20/2016    PROTIME 26.3 04/29/2013    PROTIME 20.7 03/28/2013      INR 2.25 05/21/2020    INR 4.30 05/05/2020    INR 3.18 04/28/2020    INR 2.80 04/21/2020       INR Summary                          Warfarin regimen (mg)  Date INR A/P Sun Mon Tue Wed Thu Fri Sat Mg/wk                                                                    11/20/2020 1.91 Just below goal 5 5 2.5 5 2.5 5 5 30   10/23/20 2.31 At goal 5 5 2.5 5 2.5 5 5 30   9/24/20 2.63 At goal 5 5 2.5 5 2.5 5 5 30   8/28/20 3.31 Above goal 5 5 2.5 5 2.5 5 5 30   7/16/20 2.56 At goal 5 5 2.5 5 2.5 5 5 30   7/1/20 3.03 At goal 5 5 2.5 5 2.5 5 5 30   6/22/20 2.73 At goal, continue 5 5 2.5 5 2.5 5 5 30   5/21/20 2.25 At goal, continue 5 5 2.5 5 2.5 5 5 30   5/5/20 4.30 Not at goal 5 5  - 2.5 5 5 22.5   4/28/20 3.18 At goal, continue 5 5 2.5 5 5 5 5 32.5         Assessment/Plan:    Recent hospitalizations/HC visits None reported   Recent medication changes None reported   Medications taken regularly that may interact with warfarin or alter INR ASA: M-W-F   Warfarin dose taken as prescribed YES   Signs/symptoms of bleeding NO    Vitamin K intake Consistency of servings of green, leafy vegetables per week ?  Yes   Recent vomiting/diarrhea/fever None reported   Changes in weight, activity, stress None reported   Tobacco or alcohol use Patient reports smoking NO   Patient reports having 2-3 beers per day   Upcoming surgeries or procedures None reported     Patient's INR was within range today, so patient was instructed to modify warfarin dose as above  Patient

## 2020-11-23 NOTE — PROGRESS NOTES
Spoke to patient---- patient stated no change in diet or medication---- will stay on same dose re-checking next week

## 2020-12-07 LAB — INR BLD: 2.1

## 2020-12-08 ENCOUNTER — ANTI-COAG VISIT (OUTPATIENT)
Dept: CARDIOLOGY CLINIC | Age: 75
End: 2020-12-08
Payer: MEDICARE

## 2020-12-08 PROCEDURE — 93793 ANTICOAG MGMT PT WARFARIN: CPT | Performed by: NURSE PRACTITIONER

## 2020-12-08 NOTE — PROGRESS NOTES
Spoke to patient---- patient stated no change in diet or medication---- will stay on same dose re-checking next week  [address], 1945    Anticoagulation Indication(s):  Afib    Referring Physician:   Dr. Mesfin Wu  Goal INR Range:  2.0-3.0  Duration of Anticoagulation Therapy:  Long term  Home or Lab Draw: Lab   Product patient has at home: warfarin  5 mg      Lab Results   Component Value Date    INR 2.10 12/07/2020    INR 1.91 11/20/2020    INR 2.31 10/23/2020    PROTIME 22.3 (H) 10/20/2016    PROTIME 26.3 04/29/2013    PROTIME 20.7 03/28/2013      INR 2.25 05/21/2020    INR 4.30 05/05/2020    INR 3.18 04/28/2020    INR 2.80 04/21/2020       INR Summary                          Warfarin regimen (mg)  Date INR A/P Sun Mon Tue Wed Thu Fri Sat Mg/wk                                                       12/7/20 2.1 At goal 5 5 2.5 5 2.5 5 5 30   11/20/20 1.91 Just below goal 5 5 2.5 5 2.5 5 5 30   10/23/20 2.31 At goal 5 5 2.5 5 2.5 5 5 30   9/24/20 2.63 At goal 5 5 2.5 5 2.5 5 5 30   8/28/20 3.31 Above goal 5 5 2.5 5 2.5 5 5 30   7/16/20 2.56 At goal 5 5 2.5 5 2.5 5 5 30   7/1/20 3.03 At goal 5 5 2.5 5 2.5 5 5 30   6/22/20 2.73 At goal, continue 5 5 2.5 5 2.5 5 5 30   5/21/20 2.25 At goal, continue 5 5 2.5 5 2.5 5 5 30   5/5/20 4.30 Not at goal 5 5  - 2.5 5 5 22.5   4/28/20 3.18 At goal, continue 5 5 2.5 5 5 5 5 32.5         Assessment/Plan:    Recent hospitalizations/HC visits None reported   Recent medication changes None reported   Medications taken regularly that may interact with warfarin or alter INR ASA: M-W-F   Warfarin dose taken as prescribed YES   Signs/symptoms of bleeding NO    Vitamin K intake Consistency of servings of green, leafy vegetables per week ?  Yes   Recent vomiting/diarrhea/fever None reported   Changes in weight, activity, stress None reported   Tobacco or alcohol use Patient reports smoking NO   Patient reports having 2-3 beers per day   Upcoming surgeries or procedures None reported     Patient's INR was within range today, continue warfarin dose as above  Patient was also reminded to maintain consistent vitamin K intake and call with any bleeding, medication changes, or fever/vomiting/diarrhea. Next INR check:  1/5/21    Addendum:  12/8/20  Reviewed assessment and plan. INR is at goal, continue current dose   Repeat INR in four week/s. Patient/family instructed.      Leonor Joyce, CNP

## 2020-12-28 RX ORDER — ROSUVASTATIN CALCIUM 10 MG/1
TABLET, COATED ORAL
Qty: 90 TABLET | Refills: 2 | Status: SHIPPED | OUTPATIENT
Start: 2020-12-28

## 2020-12-28 NOTE — TELEPHONE ENCOUNTER
08/07/2020 Last OV with [facility]  Next Ov will be in a year with Dr Trell Stanley.        Hyperlipidemia  Tolerates crestor 10 mg daily  10/9/2019  Tc 125  Tri 126  hdl 42 ldl 58   Stable  Will repeat in October 2020

## 2020-12-31 RX ORDER — ATENOLOL 25 MG/1
TABLET ORAL
Qty: 90 TABLET | Refills: 0 | Status: SHIPPED | OUTPATIENT
Start: 2020-12-31 | End: 2021-02-15 | Stop reason: SDUPTHER

## 2020-12-31 RX ORDER — DIGOXIN 125 MCG
TABLET ORAL
Qty: 90 TABLET | Refills: 0 | Status: SHIPPED | OUTPATIENT
Start: 2020-12-31 | End: 2021-02-15 | Stop reason: SDUPTHER

## 2021-01-12 LAB — INR BLD: 2.62

## 2021-01-13 ENCOUNTER — ANTI-COAG VISIT (OUTPATIENT)
Dept: CARDIOLOGY CLINIC | Age: 76
End: 2021-01-13
Payer: MEDICARE

## 2021-01-13 DIAGNOSIS — I48.11 LONGSTANDING PERSISTENT ATRIAL FIBRILLATION (HCC): ICD-10-CM

## 2021-01-13 PROCEDURE — 93793 ANTICOAG MGMT PT WARFARIN: CPT | Performed by: NURSE PRACTITIONER

## 2021-01-13 NOTE — PROGRESS NOTES
Spoke to patient---- patient stated no change in diet or medication---- will stay on same dose re-checking next week  [address], 1945    Anticoagulation Indication(s):  Afib    Referring Physician:   Dr. Lewis So  Goal INR Range:  2.0-3.0  Duration of Anticoagulation Therapy:  Long term  Home or Lab Draw: Lab   Product patient has at home: warfarin  5 mg      Lab Results   Component Value Date    INR 2.10 12/07/2020    INR 1.91 11/20/2020    INR 2.31 10/23/2020    PROTIME 22.3 (H) 10/20/2016    PROTIME 26.3 04/29/2013    PROTIME 20.7 03/28/2013      INR 2.25 05/21/2020    INR 4.30 05/05/2020    INR 3.18 04/28/2020    INR 2.80 04/21/2020       INR Summary                          Warfarin regimen (mg)  Date INR A/P Sun Mon Tue Wed Thu Fri Sat Mg/wk                                          1/12/21 2.62 At goal 5 5 2.5 5 2.5 5 5 30   12/7/20 2.1 At goal 5 5 2.5 5 2.5 5 5 30   11/20/20 1.91 Just below goal 5 5 2.5 5 2.5 5 5 30   10/23/20 2.31 At goal 5 5 2.5 5 2.5 5 5 30   9/24/20 2.63 At goal 5 5 2.5 5 2.5 5 5 30   8/28/20 3.31 Above goal 5 5 2.5 5 2.5 5 5 30   7/16/20 2.56 At goal 5 5 2.5 5 2.5 5 5 30   7/1/20 3.03 At goal 5 5 2.5 5 2.5 5 5 30   6/22/20 2.73 At goal, continue 5 5 2.5 5 2.5 5 5 30   5/21/20 2.25 At goal, continue 5 5 2.5 5 2.5 5 5 30   5/5/20 4.30 Not at goal 5 5  - 2.5 5 5 22.5   4/28/20 3.18 At goal, continue 5 5 2.5 5 5 5 5 32.5         Assessment/Plan:    Recent hospitalizations/HC visits None reported   Recent medication changes None reported   Medications taken regularly that may interact with warfarin or alter INR ASA: M-W-F   Warfarin dose taken as prescribed YES   Signs/symptoms of bleeding NO    Vitamin K intake Consistency of servings of green, leafy vegetables per week ?  Yes   Recent vomiting/diarrhea/fever None reported   Changes in weight, activity, stress None reported   Tobacco or alcohol use Patient reports smoking NO Patient reports having 2-3 beers per day   Upcoming surgeries or procedures None reported     Patient's INR was within range today, continue warfarin dose as above  Patient was also reminded to maintain consistent vitamin K intake and call with any bleeding, medication changes, or fever/vomiting/diarrhea. Next INR check:  2/10/21    Addendum:  1/13/21  Reviewed assessment and plan. INR is at goal, continue current dose   Repeat INR in four week/s. Patient/family instructed.      Les Benitez, CNP

## 2021-01-21 RX ORDER — LOSARTAN POTASSIUM 25 MG/1
25 TABLET ORAL DAILY
Qty: 90 TABLET | Refills: 1 | Status: CANCELLED | OUTPATIENT
Start: 2021-01-21

## 2021-01-21 NOTE — TELEPHONE ENCOUNTER
Medication Refill    Medication needing refilled:   Warfarin , losartan (not sure of  dose)    Dosage of the medication:    How are you taking this medication (QD, BID, TID, QID, PRN):    30 or 90 day supply called in:    When will you run out of your medication:    Which Pharmacy are we sending the medication to?:   [facility]

## 2021-01-22 RX ORDER — WARFARIN SODIUM 5 MG/1
TABLET ORAL
Qty: 90 TABLET | Refills: 1 | Status: SHIPPED | OUTPATIENT
Start: 2021-01-22

## 2021-02-15 LAB — INR BLD: 2.69

## 2021-02-15 RX ORDER — ATENOLOL 25 MG/1
TABLET ORAL
Qty: 90 TABLET | Refills: 0 | Status: SHIPPED | OUTPATIENT
Start: 2021-02-15 | End: 2021-05-11

## 2021-02-15 RX ORDER — DIGOXIN 125 MCG
TABLET ORAL
Qty: 90 TABLET | Refills: 0 | Status: SHIPPED | OUTPATIENT
Start: 2021-02-15 | End: 2021-05-11

## 2021-02-15 NOTE — TELEPHONE ENCOUNTER
Medication Refill    Medication needing refilled:   Atenolol , digoxin, 81mg ASA   NEW PHARMACY       Dosage of the medication:    How are you taking this medication (QD, BID, TID, QID, PRN):    30 or 90 day supply called in:    When will you run out of your medication:    Which Pharmacy are we sending the medication to?: [facility] [place] [facility]

## 2021-02-15 NOTE — TELEPHONE ENCOUNTER
Received refill request for atenolol and digoxin from [facility] pharmacy.     Last ov:2020 [facility]    Last labs:cmp 10/9/2019 [facility]    Last EK2019     Last Refill:atenolol #90 with 0 refills digoxin #90 with 0 refills 2020    Next appointment:on recall with [facility]AR[facility]

## 2021-02-16 ENCOUNTER — ANTI-COAG VISIT (OUTPATIENT)
Dept: CARDIOLOGY CLINIC | Age: 76
End: 2021-02-16
Payer: MEDICARE

## 2021-02-16 DIAGNOSIS — I48.11 LONGSTANDING PERSISTENT ATRIAL FIBRILLATION (HCC): ICD-10-CM

## 2021-02-16 PROCEDURE — 93793 ANTICOAG MGMT PT WARFARIN: CPT | Performed by: NURSE PRACTITIONER

## 2021-02-16 NOTE — PROGRESS NOTES
Spoke to patient---- patient stated no change in diet or medication---- will stay on same dose re-checking next week  [address], 1945    Anticoagulation Indication(s):  Afib    Referring Physician:   Dr. Dillon Wilde  Goal INR Range:  2.0-3.0  Duration of Anticoagulation Therapy:  Long term  Home or Lab Draw: Lab   Product patient has at home: warfarin  5 mg      Lab Results   Component Value Date    INR 2.69 02/15/2021    INR 2.62 01/12/2021    INR 2.10 12/07/2020    PROTIME 22.3 (H) 10/20/2016    PROTIME 26.3 04/29/2013    PROTIME 20.7 03/28/2013      INR 2.25 05/21/2020    INR 4.30 05/05/2020    INR 3.18 04/28/2020    INR 2.80 04/21/2020       INR Summary                          Warfarin regimen (mg)  Date INR A/P Sun Mon Tue Wed Thu Fri Sat Mg/wk                             2/15/21 2.69 At goal 5 5 2.5 5 2.5 5 5 30   1/12/21 2.62 At goal 5 5 2.5 5 2.5 5 5 30   12/7/20 2.1 At goal 5 5 2.5 5 2.5 5 5 30   11/20/20 1.91 Just below goal 5 5 2.5 5 2.5 5 5 30   10/23/20 2.31 At goal 5 5 2.5 5 2.5 5 5 30   9/24/20 2.63 At goal 5 5 2.5 5 2.5 5 5 30   8/28/20 3.31 Above goal 5 5 2.5 5 2.5 5 5 30   7/16/20 2.56 At goal 5 5 2.5 5 2.5 5 5 30   7/1/20 3.03 At goal 5 5 2.5 5 2.5 5 5 30   6/22/20 2.73 At goal, continue 5 5 2.5 5 2.5 5 5 30   5/21/20 2.25 At goal, continue 5 5 2.5 5 2.5 5 5 30   5/5/20 4.30 Not at goal 5 5  - 2.5 5 5 22.5   4/28/20 3.18 At goal, continue 5 5 2.5 5 5 5 5 32.5         Assessment/Plan:    Recent hospitalizations/HC visits None reported   Recent medication changes None reported   Medications taken regularly that may interact with warfarin or alter INR ASA: M-W-F   Warfarin dose taken as prescribed YES   Signs/symptoms of bleeding NO    Vitamin K intake Consistency of servings of green, leafy vegetables per week ?  Yes   Recent vomiting/diarrhea/fever None reported   Changes in weight, activity, stress None reported   Tobacco or alcohol use Patient reports smoking NO   Patient reports having 2-3 beers per day   Upcoming surgeries or procedures None reported     Patient's INR was within range today, continue warfarin dose as above  Patient was also reminded to maintain consistent vitamin K intake and call with any bleeding, medication changes, or fever/vomiting/diarrhea. Next INR check:  3/15/21    Addendum:  2/16/21  Reviewed assessment and plan. INR is at goal, continue current dose   Repeat INR in four week/s. Patient/family instructed.      Akanksha Patel, CNP

## 2021-02-26 ENCOUNTER — TELEPHONE (OUTPATIENT)
Dept: CARDIOLOGY CLINIC | Age: 76
End: 2021-02-26

## 2021-02-26 DIAGNOSIS — Z79.899 ENCOUNTER FOR LONG-TERM (CURRENT) USE OF MEDICATIONS: ICD-10-CM

## 2021-02-26 DIAGNOSIS — I48.91 ATRIAL FIBRILLATION, UNSPECIFIED TYPE (HCC): Primary | ICD-10-CM

## 2021-02-26 NOTE — TELEPHONE ENCOUNTER
Pt calling in to ask for a standing order for his PT-INR testing-needs to be faxed to [facility], [phone]. Dr. Hamzah May has retired, and referred to Dr. Valorie Monique, who will not follow the INR testing. He stated the [facility] was created for this reason. The pt [place] too far away for this to be a consideration. A call was placed to his PCP, Dr. Kim He, to ask if she will follow the INR. The office was closed. A fax was prepared, and sent to their office, [phone], asking this question.

## 2021-03-02 NOTE — TELEPHONE ENCOUNTER
Received a call from Dr. Lizz Velasquez office. They will not follow the INR. Stated there is an [address] at the [facility], but you have to be a pt of [facility] group. Spoke to the pt-gave him the information. He would like to stay with our doctors, if possible. He is seen in [facility]. Discussed the possibility of seeing Dr. Sharda Wilkinson, but will have to ask. He may continue to follow the INR.

## 2021-03-08 NOTE — TELEPHONE ENCOUNTER
Spoke to the pt. Advised that   Dr. Yesenia Gagnon will follow the INR. Faxed the lab order to [phone].

## 2021-03-18 LAB — INR BLD: 2.58

## 2021-03-19 ENCOUNTER — ANTI-COAG VISIT (OUTPATIENT)
Dept: CARDIOLOGY CLINIC | Age: 76
End: 2021-03-19
Payer: MEDICARE

## 2021-03-19 DIAGNOSIS — I48.11 LONGSTANDING PERSISTENT ATRIAL FIBRILLATION (HCC): ICD-10-CM

## 2021-03-19 PROCEDURE — 93793 ANTICOAG MGMT PT WARFARIN: CPT | Performed by: NURSE PRACTITIONER

## 2021-03-19 NOTE — PROGRESS NOTES
weight, activity, stress None reported   Tobacco or alcohol use Patient reports smoking NO   Patient reports having 2-3 beers per day   Upcoming surgeries or procedures None reported     Patient's INR was within range today, continue warfarin dose as above  Patient was also reminded to maintain consistent vitamin K intake and call with any bleeding, medication changes, or fever/vomiting/diarrhea. Next INR check:  4/16/21    Addendum:  3/19/21  Reviewed assessment and plan. INR is at goal, continue current dose   Repeat INR in four week/s. Patient/family instructed.      Regla Grande, CNP

## 2021-03-23 ENCOUNTER — TELEPHONE (OUTPATIENT)
Dept: CARDIOLOGY CLINIC | Age: 76
End: 2021-03-23

## 2021-03-23 NOTE — TELEPHONE ENCOUNTER
Asking for most recent office notes and testing be faxed to [phone] .  Patient has an appt 4/14/21

## 2021-03-31 ENCOUNTER — TELEPHONE (OUTPATIENT)
Dept: CARDIOLOGY CLINIC | Age: 76
End: 2021-03-31

## 2021-03-31 NOTE — TELEPHONE ENCOUNTER
Spoke with Mindy Monique at [facility], they received the ov notes and tests from 3/23/2021, but would like additional records. Mindy Monique did state patient is transferring care to new cardiologist since [facility] has retired. Advised we will need a signed medical records release for patient's medical record to be sent over. Sophie verbalized understanding and will send a release once patient has his first appt in April.

## 2021-03-31 NOTE — TELEPHONE ENCOUNTER
Who is requesting records: [employer]    What is needed: Last Ov Notes, Lab Results and any cardiac testing that was done    Phone number of the doctor/facility where records are to be sent: [phone]    Fax number of the doctor/facility where records are to be sent[de-identified] [phone]

## 2021-04-14 ENCOUNTER — TELEPHONE (OUTPATIENT)
Dept: CARDIOLOGY CLINIC | Age: 76
End: 2021-04-14

## 2021-04-14 NOTE — TELEPHONE ENCOUNTER
They had sent for his records before but only got some of them . He is there now for an appt . Could his records be faxed now ?  They are faxing a records release Fax

## 2021-04-14 NOTE — TELEPHONE ENCOUNTER
Spoke with [facility] at [facility], she stated the records they received are the most recent ov and tests they received twice back in March and those will do for today's ov with new Cardiologist.  They are needing the entire medical record sent to them for the patient for future continuation of care. [facility] notified request has been received this will be given to our Medical Records dept and they will make sure his records entirely will be sent to them.

## 2021-05-10 NOTE — TELEPHONE ENCOUNTER
Seen by d[facility] in August 2020, requested follow up in august 2021 with Dr Ronny Steinberg almost two years ago, last chem panel 8/2020 ( [facility])

## 2021-05-11 RX ORDER — DIGOXIN 125 MCG
TABLET ORAL
Qty: 90 TABLET | Refills: 0 | Status: SHIPPED | OUTPATIENT
Start: 2021-05-11

## 2021-05-11 RX ORDER — ATENOLOL 25 MG/1
TABLET ORAL
Qty: 90 TABLET | Refills: 0 | Status: SHIPPED | OUTPATIENT
Start: 2021-05-11

## 2021-05-18 NOTE — TELEPHONE ENCOUNTER
Please call and get him an appt with dkw this summer, if would like to be seen sooner, he can see an NP

## 2021-05-18 NOTE — TELEPHONE ENCOUNTER
Called pt to shimon wilkins pt got a new cardiologist at [facility]

## 2021-09-20 RX ORDER — ATENOLOL 25 MG/1
TABLET ORAL
Qty: 90 TABLET | Refills: 0 | OUTPATIENT
Start: 2021-09-20

## 2021-09-20 RX ORDER — DIGOXIN 125 MCG
TABLET ORAL
Qty: 90 TABLET | Refills: 0 | OUTPATIENT
Start: 2021-09-20

## 2025-06-27 NOTE — TELEPHONE ENCOUNTER
I spoke to the pt and he states that his last INR was drawn 2/11/2020. The report scanned in to the pt's chart today is dated 2/11/20. I informed the pt that we are not allowed to dose an INR that was done last week. He will go tomorrow AM and have his INR rechecked. He will have the clinic call our office with the results asap.
75% of the time